# Patient Record
Sex: FEMALE | Race: WHITE | Employment: FULL TIME | URBAN - METROPOLITAN AREA
[De-identification: names, ages, dates, MRNs, and addresses within clinical notes are randomized per-mention and may not be internally consistent; named-entity substitution may affect disease eponyms.]

---

## 2017-03-08 ENCOUNTER — HOSPITAL ENCOUNTER (OUTPATIENT)
Dept: GENERAL RADIOLOGY | Age: 57
Discharge: HOME | End: 2017-03-08
Payer: COMMERCIAL

## 2017-03-08 ENCOUNTER — OFFICE VISIT (OUTPATIENT)
Dept: INTERNAL MEDICINE CLINIC | Age: 57
End: 2017-03-08

## 2017-03-08 VITALS
RESPIRATION RATE: 18 BRPM | TEMPERATURE: 98.5 F | BODY MASS INDEX: 38.44 KG/M2 | HEIGHT: 66 IN | SYSTOLIC BLOOD PRESSURE: 140 MMHG | HEART RATE: 79 BPM | OXYGEN SATURATION: 95 % | DIASTOLIC BLOOD PRESSURE: 90 MMHG | WEIGHT: 239.2 LBS

## 2017-03-08 DIAGNOSIS — Z01.818 PRE-OP EXAMINATION: ICD-10-CM

## 2017-03-08 DIAGNOSIS — M76.61 ACHILLES TENDINITIS OF RIGHT LOWER EXTREMITY: ICD-10-CM

## 2017-03-08 DIAGNOSIS — M77.31 HEEL SPUR, RIGHT: Primary | ICD-10-CM

## 2017-03-08 LAB
BILIRUB UR QL STRIP: NEGATIVE
GLUCOSE UR-MCNC: NEGATIVE MG/DL
KETONES P FAST UR STRIP-MCNC: NORMAL MG/DL
PH UR STRIP: 6 [PH] (ref 4.6–8)
PROT UR QL STRIP: NORMAL MG/DL
SP GR UR STRIP: 1.02 (ref 1–1.03)
UA UROBILINOGEN AMB POC: NORMAL (ref 0.2–1)
URINALYSIS CLARITY POC: CLEAR
URINALYSIS COLOR POC: YELLOW
URINE BLOOD POC: NEGATIVE
URINE LEUKOCYTES POC: NEGATIVE
URINE NITRITES POC: NEGATIVE

## 2017-03-08 PROCEDURE — 71020 XR CHEST PA LAT: CPT

## 2017-03-08 NOTE — MR AVS SNAPSHOT
Visit Information Date & Time Provider Department Dept. Phone Encounter #  
 3/8/2017  9:45 AM Jey Merino [id number] [phone] [id number] Follow-up Instructions Return in about 1 year (around 3/8/2018) for physical.  
  
Upcoming Health Maintenance Date Due INFLUENZA AGE 9 TO ADULT 8/1/2016 FOBT Q 1 YEAR AGE 50-75 12/8/2017 BREAST CANCER SCRN MAMMOGRAM 12/8/2018 PAP AKA CERVICAL CYTOLOGY 12/1/2021 DTaP/Tdap/Td series (2 - Td) 11/24/2025 Allergies as of 3/8/2017  Review Complete On: 3/8/2017 By: Donnell Aguayo NP No Known Allergies Current Immunizations  Never Reviewed Name Date Tdap 11/24/2015 Not reviewed this visit You Were Diagnosed With   
  
 Codes Comments Heel spur, right    -  Primary ICD-10-CM: M77.31 
ICD-9-CM: 726.73 Achilles tendinitis of right lower extremity     ICD-10-CM: M76.61 
ICD-9-CM: 726.71 Pre-op examination     ICD-10-CM: P27.469 ICD-9-CM: V72.84 Vitals BP Pulse Temp Resp Height(growth percentile) Weight(growth percentile) 140/90 79 98.5 °F (36.9 °C) (Oral) 18 5' 5.98\" (1.676 m) 239 lb 3.2 oz (108.5 kg) SpO2 BMI OB Status Smoking Status 95% 38.63 kg/m2 Postmenopausal Never Smoker Vitals History BMI and BSA Data Body Mass Index Body Surface Area  
 38.63 kg/m 2 2.25 m 2 Preferred Pharmacy Pharmacy Name Phone [phone] [facility] [phone] Your Updated Medication List  
  
   
This list is accurate as of: 3/8/17 10:42 AM.  Always use your most recent med list.  
  
  
  
  
 ergocalciferol 50,000 unit capsule Commonly known as:  ERGOCALCIFEROL Take 1 Cap by mouth every seven (7) days. Indications: VITAMIN D DEFICIENCY We Performed the Following AMB POC EKG ROUTINE W/ 12 LEADS, INTER & REP [01775 CPT(R)] AMB POC URINALYSIS DIP STICK AUTO W/O MICRO [81243 CPT(R)] CBC WITH AUTOMATED DIFF [65765 CPT(R)] METABOLIC PANEL, BASIC [40271 CPT(R)] Follow-up Instructions Return in about 1 year (around 3/8/2018) for physical.  
  
To-Do List   
 03/08/2017 Imaging:  XR CHEST PA LAT Introducing \[facility]\"" & HEALTH SERVICES! Dear Judah Saul: Thank you for requesting a Farseer account. Our records indicate that you have previously registered for a Farseer account but its currently inactive. Please call our Farseer support line at [phone]. Additional Information If you have questions, please visit the Frequently Asked Questions section of the Farseer website at [email or web address] Remember, Farseer is NOT to be used for urgent needs. For medical emergencies, dial 911. Now available from your iPhone and Android! Please provide this summary of care documentation to your next provider. Your primary care clinician is listed as Jose Ramon Echevarria. If you have any questions after today's visit, please call [phone].

## 2017-03-08 NOTE — PROGRESS NOTES
RM#7  Chief Complaint   Patient presents with    Pre-op Exam     heel spur surgery right foot     Results for orders placed or performed in visit on 03/08/17   AMB POC URINALYSIS DIP STICK AUTO W/O MICRO   Result Value Ref Range    Color (UA POC) Yellow     Clarity (UA POC) Clear     Glucose (UA POC) Negative Negative    Bilirubin (UA POC) Negative Negative    Ketones (UA POC) Trace Negative    Specific gravity (UA POC) 1.025 1.001 - 1.035    Blood (UA POC) Negative Negative    pH (UA POC) 6.0 4.6 - 8.0    Protein (UA POC) 1+ Negative mg/dL    Urobilinogen (UA POC) 0.2 mg/dL 0.2 - 1    Nitrites (UA POC) Negative Negative    Leukocyte esterase (UA POC) Negative Negative       1. Have you been to the ER, urgent care clinic since your last visit? Hospitalized since your last visit? No    2. Have you seen or consulted any other health care providers outside of the [address] [place] [address] since your last visit? Include any pap smears or colon screening.  No  No living will ADV

## 2017-03-09 LAB
BASOPHILS # BLD AUTO: 0 X10E3/UL (ref 0–0.2)
BASOPHILS NFR BLD AUTO: 0 %
BUN SERPL-MCNC: 16 MG/DL (ref 6–24)
BUN/CREAT SERPL: 26 (ref 9–23)
CALCIUM SERPL-MCNC: 9.4 MG/DL (ref 8.7–10.2)
CHLORIDE SERPL-SCNC: 101 MMOL/L (ref 96–106)
CO2 SERPL-SCNC: 23 MMOL/L (ref 18–29)
CREAT SERPL-MCNC: 0.62 MG/DL (ref 0.57–1)
EOSINOPHIL # BLD AUTO: 0.1 X10E3/UL (ref 0–0.4)
EOSINOPHIL NFR BLD AUTO: 1 %
ERYTHROCYTE [DISTWIDTH] IN BLOOD BY AUTOMATED COUNT: 14.1 % (ref 12.3–15.4)
GLUCOSE SERPL-MCNC: 88 MG/DL (ref 65–99)
HCT VFR BLD AUTO: 37.3 % (ref 34–46.6)
HGB BLD-MCNC: 12.4 G/DL (ref 11.1–15.9)
IMM GRANULOCYTES # BLD: 0 X10E3/UL (ref 0–0.1)
IMM GRANULOCYTES NFR BLD: 0 %
LYMPHOCYTES # BLD AUTO: 2.4 X10E3/UL (ref 0.7–3.1)
LYMPHOCYTES NFR BLD AUTO: 40 %
MCH RBC QN AUTO: 29.5 PG (ref 26.6–33)
MCHC RBC AUTO-ENTMCNC: 33.2 G/DL (ref 31.5–35.7)
MCV RBC AUTO: 89 FL (ref 79–97)
MONOCYTES # BLD AUTO: 0.4 X10E3/UL (ref 0.1–0.9)
MONOCYTES NFR BLD AUTO: 6 %
NEUTROPHILS # BLD AUTO: 3.2 X10E3/UL (ref 1.4–7)
NEUTROPHILS NFR BLD AUTO: 53 %
PLATELET # BLD AUTO: 306 X10E3/UL (ref 150–379)
POTASSIUM SERPL-SCNC: 4 MMOL/L (ref 3.5–5.2)
RBC # BLD AUTO: 4.2 X10E6/UL (ref 3.77–5.28)
SODIUM SERPL-SCNC: 143 MMOL/L (ref 134–144)
WBC # BLD AUTO: 6.1 X10E3/UL (ref 3.4–10.8)

## 2017-03-13 PROBLEM — M77.30 HEEL SPUR: Status: ACTIVE | Noted: 2017-03-13

## 2017-03-13 PROBLEM — M76.61 ACHILLES TENDINITIS OF RIGHT LOWER EXTREMITY: Status: ACTIVE | Noted: 2017-03-13

## 2017-03-13 PROBLEM — Z01.818 PRE-OP EXAMINATION: Status: ACTIVE | Noted: 2017-03-13

## 2017-03-13 NOTE — PROGRESS NOTES
HISTORY OF PRESENT ILLNESS  Sally Alberto is a 65 y.o. female presents for pre op clearance  HPI  She is scheduled for right achilles repair due to heel spur. Dr. Cerna Hopes She reports severe right heel  pain, worse with activity. Failed PT    History reviewed. No pertinent past medical history. Past Surgical History:   Procedure Laterality Date    HX GYN      3 cesearin births    HX HEENT Bilateral     laser surgery on both eyes    HX ORTHOPAEDIC Left     remove a bone spur       Current Outpatient Prescriptions on File Prior to Visit   Medication Sig Dispense Refill    ergocalciferol (ERGOCALCIFEROL) 50,000 unit capsule Take 1 Cap by mouth every seven (7) days. Indications: VITAMIN D DEFICIENCY 6 Cap 0     No current facility-administered medications on file prior to visit. No Known Allergies     Family History   Problem Relation Age of Onset    Cancer Mother     Breast Cancer Mother 80    Cancer Sister     Breast Cancer Sister 48    Heart Disease Father      heart attack in his 66's         Review of Systems   All other systems reviewed and are negative. Physical Exam   Constitutional: She is oriented to person, place, and time. She appears well-developed and well-nourished. No distress. Cardiovascular: Normal rate, regular rhythm and normal heart sounds. Pulmonary/Chest: Effort normal and breath sounds normal.   Musculoskeletal: She exhibits no edema or deformity. Right foot: There is tenderness, bony tenderness and swelling. There is normal capillary refill, no crepitus, no deformity and no laceration. Left foot: There is swelling. There is normal range of motion, no tenderness and no bony tenderness. Feet:    Neurological: She is alert and oriented to person, place, and time. Skin: She is not diaphoretic. ASSESSMENT and PLAN    ICD-10-CM ICD-9-CM    1. Heel spur, right M77.31 726.73    2.  Achilles tendinitis of right lower extremity M76.61 726.71 3. Pre-op examination Z01.818 V72.84 CBC WITH AUTOMATED DIFF      METABOLIC PANEL, BASIC      AMB POC EKG ROUTINE W/ 12 LEADS, INTER & REP      XR CHEST PA LAT      AMB POC URINALYSIS DIP STICK AUTO W/O MICRO     Follow-up Disposition:  Return in about 1 year (around 3/8/2018) for physical.     Normal EKG.     Surgery clearance pending lab review

## 2017-10-22 ENCOUNTER — HOSPITAL ENCOUNTER (EMERGENCY)
Age: 57
Discharge: HOME | End: 2017-10-22
Attending: EMERGENCY MEDICINE
Payer: COMMERCIAL

## 2017-10-22 ENCOUNTER — APPOINTMENT (OUTPATIENT)
Dept: CT IMAGING | Age: 57
End: 2017-10-22
Attending: EMERGENCY MEDICINE
Payer: COMMERCIAL

## 2017-10-22 VITALS
DIASTOLIC BLOOD PRESSURE: 70 MMHG | HEIGHT: 66 IN | OXYGEN SATURATION: 98 % | SYSTOLIC BLOOD PRESSURE: 141 MMHG | BODY MASS INDEX: 38.51 KG/M2 | TEMPERATURE: 97.6 F | WEIGHT: 239.6 LBS | HEART RATE: 62 BPM | RESPIRATION RATE: 16 BRPM

## 2017-10-22 DIAGNOSIS — R10.31 ABDOMINAL PAIN, RIGHT LOWER QUADRANT: Primary | ICD-10-CM

## 2017-10-22 LAB
ALBUMIN SERPL-MCNC: 3.6 G/DL (ref 3.5–5)
ALBUMIN/GLOB SERPL: 1.1 {RATIO} (ref 1.1–2.2)
ALP SERPL-CCNC: 73 U/L (ref 45–117)
ALT SERPL-CCNC: 27 U/L (ref 12–78)
ANION GAP SERPL CALC-SCNC: 6 MMOL/L (ref 5–15)
APPEARANCE UR: CLEAR
AST SERPL-CCNC: 16 U/L (ref 15–37)
BACTERIA URNS QL MICRO: NEGATIVE /HPF
BASOPHILS # BLD: 0 K/UL (ref 0–0.1)
BASOPHILS NFR BLD: 0 % (ref 0–1)
BILIRUB SERPL-MCNC: 0.3 MG/DL (ref 0.2–1)
BILIRUB UR QL: NEGATIVE
BUN SERPL-MCNC: 21 MG/DL (ref 6–20)
BUN/CREAT SERPL: 28 (ref 12–20)
CALCIUM SERPL-MCNC: 8.8 MG/DL (ref 8.5–10.1)
CHLORIDE SERPL-SCNC: 107 MMOL/L (ref 97–108)
CO2 SERPL-SCNC: 28 MMOL/L (ref 21–32)
COLOR UR: NORMAL
CREAT SERPL-MCNC: 0.75 MG/DL (ref 0.55–1.02)
EOSINOPHIL # BLD: 0.1 K/UL (ref 0–0.4)
EOSINOPHIL NFR BLD: 1 % (ref 0–7)
EPITH CASTS URNS QL MICRO: NORMAL /LPF
ERYTHROCYTE [DISTWIDTH] IN BLOOD BY AUTOMATED COUNT: 12.9 % (ref 11.5–14.5)
GLOBULIN SER CALC-MCNC: 3.3 G/DL (ref 2–4)
GLUCOSE SERPL-MCNC: 93 MG/DL (ref 65–100)
GLUCOSE UR STRIP.AUTO-MCNC: NEGATIVE MG/DL
HCT VFR BLD AUTO: 37.4 % (ref 35–47)
HGB BLD-MCNC: 12.1 G/DL (ref 11.5–16)
HGB UR QL STRIP: NEGATIVE
HYALINE CASTS URNS QL MICRO: NORMAL /LPF (ref 0–5)
KETONES UR QL STRIP.AUTO: NEGATIVE MG/DL
LEUKOCYTE ESTERASE UR QL STRIP.AUTO: NEGATIVE
LYMPHOCYTES # BLD: 2.4 K/UL (ref 0.8–3.5)
LYMPHOCYTES NFR BLD: 43 % (ref 12–49)
MCH RBC QN AUTO: 29.4 PG (ref 26–34)
MCHC RBC AUTO-ENTMCNC: 32.4 G/DL (ref 30–36.5)
MCV RBC AUTO: 90.8 FL (ref 80–99)
MONOCYTES # BLD: 0.4 K/UL (ref 0–1)
MONOCYTES NFR BLD: 6 % (ref 5–13)
NEUTS SEG # BLD: 2.8 K/UL (ref 1.8–8)
NEUTS SEG NFR BLD: 50 % (ref 32–75)
NITRITE UR QL STRIP.AUTO: NEGATIVE
PH UR STRIP: 6 [PH] (ref 5–8)
PLATELET # BLD AUTO: 220 K/UL (ref 150–400)
POTASSIUM SERPL-SCNC: 3.8 MMOL/L (ref 3.5–5.1)
PROT SERPL-MCNC: 6.9 G/DL (ref 6.4–8.2)
PROT UR STRIP-MCNC: NEGATIVE MG/DL
RBC # BLD AUTO: 4.12 M/UL (ref 3.8–5.2)
RBC #/AREA URNS HPF: NORMAL /HPF (ref 0–5)
SODIUM SERPL-SCNC: 141 MMOL/L (ref 136–145)
SP GR UR REFRACTOMETRY: 1.02 (ref 1–1.03)
UR CULT HOLD, URHOLD: NORMAL
UROBILINOGEN UR QL STRIP.AUTO: 0.2 EU/DL (ref 0.2–1)
WBC # BLD AUTO: 5.7 K/UL (ref 3.6–11)
WBC URNS QL MICRO: NORMAL /HPF (ref 0–4)

## 2017-10-22 PROCEDURE — 85025 COMPLETE CBC W/AUTO DIFF WBC: CPT | Performed by: EMERGENCY MEDICINE

## 2017-10-22 PROCEDURE — 96361 HYDRATE IV INFUSION ADD-ON: CPT

## 2017-10-22 PROCEDURE — 96360 HYDRATION IV INFUSION INIT: CPT

## 2017-10-22 PROCEDURE — 74177 CT ABD & PELVIS W/CONTRAST: CPT

## 2017-10-22 PROCEDURE — 74011000258 HC RX REV CODE- 258: Performed by: EMERGENCY MEDICINE

## 2017-10-22 PROCEDURE — 87491 CHLMYD TRACH DNA AMP PROBE: CPT | Performed by: EMERGENCY MEDICINE

## 2017-10-22 PROCEDURE — 99283 EMERGENCY DEPT VISIT LOW MDM: CPT

## 2017-10-22 PROCEDURE — 74011636320 HC RX REV CODE- 636/320: Performed by: EMERGENCY MEDICINE

## 2017-10-22 PROCEDURE — 36415 COLL VENOUS BLD VENIPUNCTURE: CPT | Performed by: EMERGENCY MEDICINE

## 2017-10-22 PROCEDURE — 81001 URINALYSIS AUTO W/SCOPE: CPT | Performed by: EMERGENCY MEDICINE

## 2017-10-22 PROCEDURE — 74011250636 HC RX REV CODE- 250/636: Performed by: EMERGENCY MEDICINE

## 2017-10-22 PROCEDURE — 80053 COMPREHEN METABOLIC PANEL: CPT | Performed by: EMERGENCY MEDICINE

## 2017-10-22 RX ORDER — HYDROCODONE BITARTRATE AND ACETAMINOPHEN 5; 325 MG/1; MG/1
1 TABLET ORAL
Qty: 5 TAB | Refills: 0 | Status: SHIPPED | OUTPATIENT
Start: 2017-10-22 | End: 2017-11-02

## 2017-10-22 RX ORDER — SODIUM CHLORIDE 9 MG/ML
1000 INJECTION, SOLUTION INTRAVENOUS ONCE
Status: COMPLETED | OUTPATIENT
Start: 2017-10-22 | End: 2017-10-22

## 2017-10-22 RX ORDER — SODIUM CHLORIDE 0.9 % (FLUSH) 0.9 %
10 SYRINGE (ML) INJECTION
Status: COMPLETED | OUTPATIENT
Start: 2017-10-22 | End: 2017-10-22

## 2017-10-22 RX ADMIN — SODIUM CHLORIDE 100 ML: 900 INJECTION, SOLUTION INTRAVENOUS at 20:59

## 2017-10-22 RX ADMIN — IOPAMIDOL 100 ML: 755 INJECTION, SOLUTION INTRAVENOUS at 20:58

## 2017-10-22 RX ADMIN — Medication 10 ML: at 20:59

## 2017-10-22 RX ADMIN — SODIUM CHLORIDE 1000 ML: 900 INJECTION, SOLUTION INTRAVENOUS at 19:59

## 2017-10-22 NOTE — ED TRIAGE NOTES
[facility] lower groin pain that is painful for her to ambulate x 2 days. Denies urinary symptoms.

## 2017-10-22 NOTE — ED PROVIDER NOTES
HPI Comments: 71-year-old female presents to the room for evaluation of right lower quadrant abdominal pain. Pain began 2 days ago. Pain has been intermittent. Pain is described as excruciating when it is present. Pain is intermittent in nature and lasts for approximately 3-4 hours. Pain resolvesby itself. Patient has no associated nausea, vomiting, diarrhea. No fever or chills. No noted blood in urine. No dysuria frequency or urgency. No known precipitating events. Patient has never had pain like this before. Currently the pain is minimal. Pain rated 0/10. Avani Correa Patient had 2 Aleve this morning with no relief. No aggravating factors. No change in location of pain. No decrease in appetite. Social hx  Nonsmoker  No alcohol    Patient is a 65 y.o. female presenting with groin pain. The history is provided by the patient. Groin Pain   Associated symptoms include abdominal pain. Pertinent negatives include no chest pain, no headaches and no shortness of breath. History reviewed. No pertinent past medical history. Past Surgical History:   Procedure Laterality Date    HX GYN      3 cesearin births    HX HEENT Bilateral     laser surgery on both eyes    HX ORTHOPAEDIC Left     remove a bone spur         Family History:   Problem Relation Age of Onset    Cancer Mother     Breast Cancer Mother 80    Cancer Sister     Breast Cancer Sister 48    Heart Disease Father      heart attack in his 66's       Social History     Social History    Marital status: SINGLE     Spouse name: N/A    Number of children: N/A    Years of education: N/A     Occupational History    Not on file. Social History Main Topics    Smoking status: Never Smoker    Smokeless tobacco: Never Used    Alcohol use No    Drug use: No    Sexual activity: Not on file     Other Topics Concern    Not on file     Social History Narrative         ALLERGIES: Review of patient's allergies indicates no known allergies.     Review of Systems Constitutional: Negative for chills and fever. Respiratory: Negative for cough and shortness of breath. Cardiovascular: Negative for chest pain and palpitations. Gastrointestinal: Positive for abdominal pain. Negative for blood in stool, diarrhea, nausea and vomiting. Genitourinary: Negative for dysuria, flank pain, frequency and urgency. Musculoskeletal: Negative for arthralgias, myalgias, neck pain and neck stiffness. Skin: Negative for rash and wound. Neurological: Negative for dizziness, numbness and headaches. All other systems reviewed and are negative. Vitals:    10/22/17 1829   BP: (!) 161/98   Pulse: 69   Resp: 16   Temp: 97.4 °F (36.3 °C)   SpO2: 99%   Weight: 108.7 kg (239 lb 9.6 oz)   Height: 5' 6\" (1.676 m)            Physical Exam   Constitutional: She is oriented to person, place, and time. She appears well-developed and well-nourished. No distress. HENT:   Head: Normocephalic and atraumatic. Right Ear: External ear normal.   Left Ear: External ear normal.   Eyes: Conjunctivae and EOM are normal. Pupils are equal, round, and reactive to light. Neck: Normal range of motion. Neck supple. Cardiovascular: Normal rate and regular rhythm. Pulmonary/Chest: Effort normal and breath sounds normal. No respiratory distress. She has no wheezes. Abdominal: Soft. Normal appearance and bowel sounds are normal. She exhibits no shifting dullness, no distension, no pulsatile liver, no abdominal bruit, no pulsatile midline mass and no mass. There is no hepatosplenomegaly, splenomegaly or hepatomegaly. There is no tenderness. There is no rigidity, no rebound, no guarding, no CVA tenderness, no tenderness at McBurney's point and negative Medina's sign. Abdomen exposed for exam.  Soft, nontender. No pain with palpation. No peritoneal signs   Musculoskeletal: Normal range of motion. She exhibits no edema or tenderness.    Neurological: She is alert and oriented to person, place, and time. She has normal reflexes. No cranial nerve deficit. Coordination normal.   Skin: Skin is warm and dry. No rash noted. No erythema. Psychiatric: She has a normal mood and affect. Her behavior is normal. Judgment and thought content normal.   Nursing note and vitals reviewed. MDM  Number of Diagnoses or Management Options  Abdominal pain, right lower quadrant:   Diagnosis management comments: 59-year-old female presented for right lower quadrant abdominal pain. Abdomen soft and nontender with no pain on exam. She is afebrile. She is nontoxic appearing. Plan: Labs, urinalysis, CT scan    10:05 PM  Pt reevaluated. Pt continues to decline pain medicine. Patient is well hydrated, well appearing, and in no respiratory distress. Physical exam is reassuring, and without signs of serious illness. Given the patient's history, clinical course, physical exam, and imaging findings, abdominal pain is unlikely secondary to a surgical etiology. Pt has been offered pelvic ultrasound and exam. Pt declines. Patient will be discharged home with pain control and follow-up with primary care physician in one to two days. Patient and caregivers were instructed on signs and symptoms of reasons to return including fever, worsening pain, vomiting, blood in the stool or any other concerns. Standard narcotic and sedating medication warnings given  Patient's results have been reviewed with them. Patient and/or family have verbally conveyed their understanding and agreement of the patient's signs, symptoms, diagnosis, treatment and prognosis and additionally agree to follow up as recommended or return to the Emergency Room should their condition change prior to follow-up.   Discharge instructions have also been provided to the patient with some educational information regarding their diagnosis as well a list of reasons why they would want to return to the ER prior to their follow-up appointment should their condition change. Amount and/or Complexity of Data Reviewed  Discuss the patient with other providers: yes (ER attending-Rayo)    Patient Progress  Patient progress: stable    ED Course       Procedures             Pt case including HPI, PE, and all available lab and radiology results has been discussed with attending physician. Opportunity to evaluate patient has been provided to ER attending. Discharge and prescription plan has been agreed upon.

## 2017-10-23 LAB
C TRACH DNA SPEC QL NAA+PROBE: NEGATIVE
N GONORRHOEA DNA SPEC QL NAA+PROBE: NEGATIVE
SAMPLE TYPE: NORMAL
SERVICE CMNT-IMP: NORMAL
SPECIMEN SOURCE: NORMAL

## 2017-10-23 NOTE — DISCHARGE INSTRUCTIONS

## 2017-11-02 ENCOUNTER — OFFICE VISIT (OUTPATIENT)
Dept: INTERNAL MEDICINE CLINIC | Age: 57
End: 2017-11-02

## 2017-11-02 VITALS
DIASTOLIC BLOOD PRESSURE: 80 MMHG | RESPIRATION RATE: 12 BRPM | HEIGHT: 66 IN | TEMPERATURE: 98 F | BODY MASS INDEX: 38.41 KG/M2 | OXYGEN SATURATION: 98 % | HEART RATE: 77 BPM | SYSTOLIC BLOOD PRESSURE: 141 MMHG | WEIGHT: 239 LBS

## 2017-11-02 DIAGNOSIS — Z23 ENCOUNTER FOR IMMUNIZATION: ICD-10-CM

## 2017-11-02 DIAGNOSIS — R06.83 SNORING: ICD-10-CM

## 2017-11-02 DIAGNOSIS — Z12.39 SCREENING FOR BREAST CANCER: ICD-10-CM

## 2017-11-02 DIAGNOSIS — Z13.220 SCREENING FOR LIPID DISORDERS: ICD-10-CM

## 2017-11-02 DIAGNOSIS — Z12.11 SCREEN FOR COLON CANCER: ICD-10-CM

## 2017-11-02 DIAGNOSIS — Z00.129 WELL ADOLESCENT VISIT: Primary | ICD-10-CM

## 2017-11-02 DIAGNOSIS — I10 BENIGN ESSENTIAL HTN: ICD-10-CM

## 2017-11-02 NOTE — MR AVS SNAPSHOT
Visit Information Date & Time Provider Department Dept. Phone Encounter #  
 11/2/2017  3:45 PM Ozzie Golden MD [address] [facility] [phone] [id number] Follow-up Instructions Return in about 3 months (around 2/2/2018) for follw-up bp and dietary change. Upcoming Health Maintenance Date Due INFLUENZA AGE 9 TO ADULT 8/1/2017 FOBT Q 1 YEAR AGE 50-75 12/8/2017 BREAST CANCER SCRN MAMMOGRAM 12/8/2018 PAP AKA CERVICAL CYTOLOGY 12/1/2021 DTaP/Tdap/Td series (2 - Td) 11/24/2025 Allergies as of 11/2/2017  Review Complete On: 11/2/2017 By: Parminder Morrissey LPN No Known Allergies Current Immunizations  Never Reviewed Name Date Influenza Vaccine (Quad) PF  Incomplete Tdap 11/24/2015 Not reviewed this visit You Were Diagnosed With   
  
 Codes Comments Well adolescent visit    -  Primary ICD-10-CM: Z00.129 ICD-9-CM: V20.2 Encounter for immunization     ICD-10-CM: D98 ICD-9-CM: V03.89 Snoring     ICD-10-CM: R06.83 
ICD-9-CM: 786.09 Screen for colon cancer     ICD-10-CM: Z12.11 ICD-9-CM: V76.51 Screening for breast cancer     ICD-10-CM: Z12.31 
ICD-9-CM: V76.10 Benign essential HTN     ICD-10-CM: I10 
ICD-9-CM: 401.1 Class 2 obesity due to excess calories with serious comorbidity in adult, unspecified BMI     ICD-10-CM: E66.09 
ICD-9-CM: 278.00 Screening for lipid disorders     ICD-10-CM: Z13.220 ICD-9-CM: V77.91 Vitals BP Pulse Temp Resp Height(growth percentile) Weight(growth percentile) 141/80 (BP 1 Location: Right arm, BP Patient Position: Sitting) 77 98 °F (36.7 °C) (Oral) 12 5' 6\" (1.676 m) 239 lb (108.4 kg) SpO2 BMI OB Status Smoking Status 98% 38.58 kg/m2 Postmenopausal Never Smoker BMI and BSA Data Body Mass Index Body Surface Area 38.58 kg/m 2 2.25 m 2 Preferred Pharmacy Pharmacy Name Phone [facility] [phone] Your Updated Medication List  
  
   
This list is accurate as of: 11/2/17  4:35 PM.  Always use your most recent med list.  
  
  
  
  
 ergocalciferol 50,000 unit capsule Commonly known as:  ERGOCALCIFEROL Take 1 Cap by mouth every seven (7) days. Indications: VITAMIN D DEFICIENCY We Performed the Following INFLUENZA VIRUS VAC QUAD,SPLIT,PRESV FREE SYRINGE IM [id number] CPT(R)] OCCULT BLOOD, IMMUNOASSAY (FIT) [id number] CPT(R)] Follow-up Instructions Return in about 3 months (around 2/2/2018) for follw-up bp and dietary change. To-Do List   
 11/02/2017 Lab:  LIPID PANEL   
  
 11/06/2017 Imaging:  KATHERINE MAMMO BI SCREENING INCL CAD Patient Instructions Sleep Apnea: Care Instructions Your Care Instructions Sleep apnea means that you frequently stop breathing for 10 seconds or longer during sleep. It can be mild to severe, based on the number of times an hour that you stop breathing or have slowed breathing. Blocked or narrowed airways in your nose, mouth, or throat can cause sleep apnea. Your airway can become blocked when your throat muscles and tongue relax during sleep. You can treat sleep apnea at home by making lifestyle changes. You also can use a CPAP breathing machine that keeps tissues in the throat from blocking your airway. Or your doctor may suggest that you use a breathing device while you sleep. It helps keep your airway open. This could be a device that you put in your mouth. Other examples include strips or disks that you use on your nose. In some cases, surgery may be needed to remove enlarged tissues in the throat. Follow-up care is a key part of your treatment and safety.  Be sure to make and go to all appointments, and call your doctor if you are having problems. It's also a good idea to know your test results and keep a list of the medicines you take. How can you care for yourself at home? · Lose weight, if needed. It may reduce the number of times you stop breathing or have slowed breathing. · Sleep on your side. It may stop mild apnea. If you tend to roll onto your back, sew a pocket in the back of your pajama top. Put a tennis ball into the pocket, and stitch the pocket shut. This will help keep you from sleeping on your back. · Avoid alcohol and medicines such as sleeping pills and sedatives before bed. · Do not smoke. Smoking can make sleep apnea worse. If you need help quitting, talk to your doctor about stop-smoking programs and medicines. These can increase your chances of quitting for good. · Prop up the head of your bed 4 to 6 inches by putting bricks under the legs of the bed. · Treat breathing problems, such as a stuffy nose, caused by a cold or allergies. · Try a continuous positive airway pressure (CPAP) breathing machine if your doctor recommends it. The machine keeps your airway open when you sleep. · If CPAP does not work for you, ask your doctor if you can try other breathing machines. A bilevel positive airway pressure machine uses one type of air pressure for breathing in and another type for breathing out. Another device raises or lowers air pressure as needed while you breathe. · Talk to your doctor if: 
¨ Your nose feels dry or bleeds when you use one of these machines. You may need to increase moisture in the air. A humidifier may help. ¨ Your nose is runny or stuffy from using a breathing machine. Decongestants or a corticosteroid nasal spray may help. ¨ You are sleepy during the day and it gets in the way of the normal things you do. Do not drive when you are drowsy. When should you call for help? Watch closely for changes in your health, and be sure to contact your doctor if: ? · You still have sleep apnea even though you have made lifestyle changes. ? · You are thinking of trying a device such as CPAP. ? · You are having problems using a CPAP or similar machine. Where can you learn more? Go to http://clyde-silva.info/. Enter U447 in the search box to learn more about \"Sleep Apnea: Care Instructions. \" Current as of: May 12, 2017 Content Version: 11.4 © 3657-4790 [employer]. Care instructions adapted under license by [employer] (which disclaims liability or warranty for this information). If you have questions about a medical condition or this instruction, always ask your healthcare professional. [address] any warranty or liability for your use of this information. Learning About Low-Carbohydrate Diets for Weight Loss What is a low-carbohydrate diet? Low-carb diets avoid foods that are high in carbohydrate. These high-carb foods include pasta, bread, rice, cereal, fruits, and starchy vegetables. Instead, these diets usually have you eat foods that are high in fat and protein. Many people lose weight quickly on a low-carb diet. But the early weight loss is water. People on this diet often gain the weight back after they start eating carbs again. Not all diet plans are safe or work well. A lot of the evidence shows that low-carb diets aren't healthy. That's because these diets often don't include healthy foods like fruits and vegetables. Losing weight safely means balancing protein, fat, and carbs with every meal and snack. And low-carb diets don't always provide the vitamins, minerals, and fiber you need. If you have a serious medical condition, talk to your doctor before you try any diet. These conditions include kidney disease, heart disease, type 2 diabetes, high cholesterol, and high blood pressure. If you are pregnant, it may not be safe for your baby if you are on a low-carb diet. How can you lose weight safely? You might have heard that a diet plan helped another person lose weight. But that doesn't mean that it will work for you. It is very hard to stay on a diet that includes lots of big changes in your eating habits. If you want to get to a healthy weight and stay there, making healthy lifestyle changes will often work better than dieting. These steps can help. · Make a plan for change. Work with your doctor to create a plan that is right for you. · See a dietitian. He or she can show you how to make healthy changes in your eating habits. · Manage stress. If you have a lot of stress in your life, it can be hard to focus on making healthy changes to your daily habits. · Track your food and activity. You are likely to do better at losing weight if you keep track of what you eat and what you do. Follow-up care is a key part of your treatment and safety. Be sure to make and go to all appointments, and call your doctor if you are having problems. It's also a good idea to know your test results and keep a list of the medicines you take. Where can you learn more? Go to http://clyde-silva.info/. Enter A121 in the search box to learn more about \"Learning About Low-Carbohydrate Diets for Weight Loss. \" Current as of: May 12, 2017 Content Version: 11.4 © 3230-8950 [employer]. Care instructions adapted under license by [employer] (which disclaims liability or warranty for this information). If you have questions about a medical condition or this instruction, always ask your healthcare professional. [place] [address] any warranty or liability for your use of this information. Introducing [facility]! Dear Jennifer Courser: Thank you for requesting a Anametrix account. Our records indicate that you have previously registered for a Anametrix account but its currently inactive. Please call our Anametrix support line at [phone]. Additional Information If you have questions, please visit the Frequently Asked Questions section of the FlyData[employer] website at [email or web address] Remember, [facility] [employer] is NOT to be used for urgent needs. For medical emergencies, dial 911. Now available from your iPhone and Android! Please provide this summary of care documentation to your next provider. Your primary care clinician is listed as July Ceballos. If you have any questions after today's visit, please call [phone].

## 2017-11-02 NOTE — PROGRESS NOTES
Rm#4  Chief Complaint   Patient presents with    Hypertension     1. Have you been to the ER, urgent care clinic since your last visit? Hospitalized since your last visit? Yes pain in right side, [facility], 10-20-17    2. Have you seen or consulted any other health care providers outside of the [address] [place] [address] since your last visit? Include any pap smears or colon screening. Yes dR. [address]  -ultrasound 11-1-17-normal   Health Maintenance Due   Topic Date Due    INFLUENZA AGE 9 TO ADULT  08/01/2017    FOBT Q 1 YEAR AGE 50-75  12/08/2017     Pt wants flu vacc  Hm reviewed

## 2017-11-02 NOTE — PROGRESS NOTES
ARABELLA Alberto is a 65 y.o. female, she presents today for:    Recent episode of right lower quadrat pain. Seen in ER and gynecologist. CT and ultrasound are normal. Pain has receded somewhat. Blood pressure borderline elevated. Works nights, [employer]. Also works for [employer]. PMH/PSH: reviewed and updated  Sochx:  reports that she has never smoked. She has never used smokeless tobacco. She reports that she does not drink alcohol or use illicit drugs. Famhx: reviewed and updated     All: No Known Allergies  Med:   Current Outpatient Prescriptions   Medication Sig    HYDROcodone-acetaminophen (NORCO) 5-325 mg per tablet Take 1 Tab by mouth every six (6) hours as needed for Pain. Max Daily Amount: 4 Tabs.  ergocalciferol (ERGOCALCIFEROL) 50,000 unit capsule Take 1 Cap by mouth every seven (7) days. Indications: VITAMIN D DEFICIENCY     No current facility-administered medications for this visit. Review of Systems   Constitutional: Negative for chills, fever and malaise/fatigue. Respiratory: Negative for shortness of breath. Cardiovascular: Negative for chest pain. PE:  Blood pressure 141/80, pulse 77, temperature 98 °F (36.7 °C), temperature source Oral, resp. rate 12, height 5' 6\" (1.676 m), weight 239 lb (108.4 kg), SpO2 98 %. Body mass index is 38.58 kg/(m^2). Physical Exam   Constitutional: She is oriented to person, place, and time. She appears well-developed and well-nourished. No distress. HENT:   Head: Normocephalic. Mouth/Throat: Oropharynx is clear and moist.   Eyes: Conjunctivae and EOM are normal.   Neck: Neck supple. Cardiovascular: Normal rate, regular rhythm and normal heart sounds. Pulmonary/Chest: Effort normal and breath sounds normal.   Neurological: She is alert and oriented to person, place, and time. Skin: Skin is warm and dry. Nursing note and vitals reviewed. Labs:   No results found for any visits on 11/02/17.     A/P:  65 y.o. female ICD-10-CM ICD-9-CM    1. Well adolescent visit Z00.129 V20.2    2. Encounter for immunization Z23 V03.89 INFLUENZA VIRUS VAC QUAD,SPLIT,PRESV FREE SYRINGE IM   3. Snoring R06.83 786.09    4. Screen for colon cancer Z12.11 V76.51 OCCULT BLOOD, IMMUNOASSAY (FIT)   5. Screening for breast cancer Z12.31 V76.10 KATHERINE MAMMO BI SCREENING INCL CAD   6. Benign essential HTN I10 401.1    7. Class 2 obesity due to excess calories with serious comorbidity in adult, unspecified BMI E66.09 278.00    8. Screening for lipid disorders Z13.220 V77.91 LIPID PANEL     Well woman (non-gyn) exam: history and exam revealed issues as noted below. Cancer screening: pap up to date. Recently seen by gyn as well and will be following up if any further vaginal bleeding. Endometrial stripe between 4 and 5mm per patient. -  Fit test ordered. - mammogram ordered  Vaccine status: up to date, flu vaccine today  Cardiovascular risk: BP well controlled, lipid screen requested  Bone health: daily vit D supplement encouraged. Diet and Exercise: discussed weight loss through low carb diet. HTN: early stage 1. Patient would like to commit to lifestyle change, Hold on starting medication for next 3 months.    - discussed sleep apnea screening and provided information.    - She was given AVS and expressed understanding with the diagnosis and plan as discussed. Follow-up Disposition: Not on File  No future appointments.

## 2017-11-02 NOTE — PATIENT INSTRUCTIONS
Sleep Apnea: Care Instructions  Your Care Instructions    Sleep apnea means that you frequently stop breathing for 10 seconds or longer during sleep. It can be mild to severe, based on the number of times an hour that you stop breathing or have slowed breathing. Blocked or narrowed airways in your nose, mouth, or throat can cause sleep apnea. Your airway can become blocked when your throat muscles and tongue relax during sleep. You can treat sleep apnea at home by making lifestyle changes. You also can use a CPAP breathing machine that keeps tissues in the throat from blocking your airway. Or your doctor may suggest that you use a breathing device while you sleep. It helps keep your airway open. This could be a device that you put in your mouth. Other examples include strips or disks that you use on your nose. In some cases, surgery may be needed to remove enlarged tissues in the throat. Follow-up care is a key part of your treatment and safety. Be sure to make and go to all appointments, and call your doctor if you are having problems. It's also a good idea to know your test results and keep a list of the medicines you take. How can you care for yourself at home? · Lose weight, if needed. It may reduce the number of times you stop breathing or have slowed breathing. · Sleep on your side. It may stop mild apnea. If you tend to roll onto your back, sew a pocket in the back of your pajama top. Put a tennis ball into the pocket, and stitch the pocket shut. This will help keep you from sleeping on your back. · Avoid alcohol and medicines such as sleeping pills and sedatives before bed. · Do not smoke. Smoking can make sleep apnea worse. If you need help quitting, talk to your doctor about stop-smoking programs and medicines. These can increase your chances of quitting for good. · Prop up the head of your bed 4 to 6 inches by putting bricks under the legs of the bed.   · Treat breathing problems, such as a stuffy nose, caused by a cold or allergies. · Try a continuous positive airway pressure (CPAP) breathing machine if your doctor recommends it. The machine keeps your airway open when you sleep. · If CPAP does not work for you, ask your doctor if you can try other breathing machines. A bilevel positive airway pressure machine uses one type of air pressure for breathing in and another type for breathing out. Another device raises or lowers air pressure as needed while you breathe. · Talk to your doctor if:  ¨ Your nose feels dry or bleeds when you use one of these machines. You may need to increase moisture in the air. A humidifier may help. ¨ Your nose is runny or stuffy from using a breathing machine. Decongestants or a corticosteroid nasal spray may help. ¨ You are sleepy during the day and it gets in the way of the normal things you do. Do not drive when you are drowsy. When should you call for help? Watch closely for changes in your health, and be sure to contact your doctor if:  ? · You still have sleep apnea even though you have made lifestyle changes. ? · You are thinking of trying a device such as CPAP. ? · You are having problems using a CPAP or similar machine. Where can you learn more? Go to http://clyde-silva.info/. Enter F952 in the search box to learn more about \"Sleep Apnea: Care Instructions. \"  Current as of: May 12, 2017  Content Version: 11.4  © 0141-8371 [employer]. Care instructions adapted under license by [employer] (which disclaims liability or warranty for this information). If you have questions about a medical condition or this instruction, always ask your healthcare professional. [address] any warranty or liability for your use of this information. Learning About Low-Carbohydrate Diets for Weight Loss  What is a low-carbohydrate diet? Low-carb diets avoid foods that are high in carbohydrate.  These high-carb foods include pasta, bread, rice, cereal, fruits, and starchy vegetables. Instead, these diets usually have you eat foods that are high in fat and protein. Many people lose weight quickly on a low-carb diet. But the early weight loss is water. People on this diet often gain the weight back after they start eating carbs again. Not all diet plans are safe or work well. A lot of the evidence shows that low-carb diets aren't healthy. That's because these diets often don't include healthy foods like fruits and vegetables. Losing weight safely means balancing protein, fat, and carbs with every meal and snack. And low-carb diets don't always provide the vitamins, minerals, and fiber you need. If you have a serious medical condition, talk to your doctor before you try any diet. These conditions include kidney disease, heart disease, type 2 diabetes, high cholesterol, and high blood pressure. If you are pregnant, it may not be safe for your baby if you are on a low-carb diet. How can you lose weight safely? You might have heard that a diet plan helped another person lose weight. But that doesn't mean that it will work for you. It is very hard to stay on a diet that includes lots of big changes in your eating habits. If you want to get to a healthy weight and stay there, making healthy lifestyle changes will often work better than dieting. These steps can help. · Make a plan for change. Work with your doctor to create a plan that is right for you. · See a dietitian. He or she can show you how to make healthy changes in your eating habits. · Manage stress. If you have a lot of stress in your life, it can be hard to focus on making healthy changes to your daily habits. · Track your food and activity. You are likely to do better at losing weight if you keep track of what you eat and what you do. Follow-up care is a key part of your treatment and safety.  Be sure to make and go to all appointments, and call your doctor if you are having problems. It's also a good idea to know your test results and keep a list of the medicines you take. Where can you learn more? Go to http://clyde-silva.info/. Enter A121 in the search box to learn more about \"Learning About Low-Carbohydrate Diets for Weight Loss. \"  Current as of: May 12, 2017  Content Version: 11.4  © 8151-2340 [employer]. Care instructions adapted under license by [employer] (which disclaims liability or warranty for this information). If you have questions about a medical condition or this instruction, always ask your healthcare professional. [address] any warranty or liability for your use of this information.

## 2018-08-20 ENCOUNTER — OFFICE VISIT (OUTPATIENT)
Dept: INTERNAL MEDICINE CLINIC | Age: 58
End: 2018-08-20

## 2018-08-20 VITALS
TEMPERATURE: 98.5 F | OXYGEN SATURATION: 96 % | SYSTOLIC BLOOD PRESSURE: 155 MMHG | HEART RATE: 70 BPM | WEIGHT: 226.6 LBS | HEIGHT: 66 IN | BODY MASS INDEX: 36.42 KG/M2 | DIASTOLIC BLOOD PRESSURE: 96 MMHG | RESPIRATION RATE: 13 BRPM

## 2018-08-20 DIAGNOSIS — I10 ESSENTIAL HYPERTENSION: Primary | ICD-10-CM

## 2018-08-20 DIAGNOSIS — Z12.11 SCREEN FOR COLON CANCER: ICD-10-CM

## 2018-08-20 DIAGNOSIS — Z12.39 BREAST CANCER SCREENING: ICD-10-CM

## 2018-08-20 DIAGNOSIS — R53.83 FATIGUE, UNSPECIFIED TYPE: ICD-10-CM

## 2018-08-20 DIAGNOSIS — Z13.220 SCREENING FOR LIPID DISORDERS: ICD-10-CM

## 2018-08-20 DIAGNOSIS — E55.9 VITAMIN D DEFICIENCY: ICD-10-CM

## 2018-08-20 RX ORDER — HYDROCHLOROTHIAZIDE 12.5 MG/1
12.5 TABLET ORAL DAILY
Qty: 30 TAB | Refills: 2 | Status: SHIPPED | OUTPATIENT
Start: 2018-08-20 | End: 2018-10-02 | Stop reason: ALTCHOICE

## 2018-08-20 NOTE — MR AVS SNAPSHOT
[address]  [facility] [address] [address] 
[phone] Patient: Mirta Fung MRN: [id number] :1960 Visit Information Date & Time Provider Department Dept. Phone Encounter #  
 2018  8:15 AM Kell Oropeza MD [address] [phone] [id number] Follow-up Instructions Return in about 6 weeks (around 10/1/2018) for 1-2 monthsfor BP check, follow-up HTN/fatigue. Upcoming Health Maintenance Date Due FOBT Q 1 YEAR AGE 50-75 2017 Influenza Age 5 to Adult 2018 BREAST CANCER SCRN MAMMOGRAM 2018 PAP AKA CERVICAL CYTOLOGY 2021 DTaP/Tdap/Td series (2 - Td) 2025 Allergies as of 2018  Review Complete On: 2018 By: Kell Oropeza MD  
 No Known Allergies Current Immunizations  Reviewed on 2017 Name Date Influenza Vaccine (Quad) PF 2017  4:46 PM  
 Tdap 2015 Not reviewed this visit You Were Diagnosed With   
  
 Codes Comments Essential hypertension    -  Primary ICD-10-CM: I10 
ICD-9-CM: 401.9 Vitamin D deficiency     ICD-10-CM: E55.9 ICD-9-CM: 268.9 Fatigue, unspecified type     ICD-10-CM: R53.83 ICD-9-CM: 780.79 Breast cancer screening     ICD-10-CM: Z12.31 
ICD-9-CM: V76.10 Screening for lipid disorders     ICD-10-CM: Z13.220 ICD-9-CM: V77.91 Screen for colon cancer     ICD-10-CM: Z12.11 ICD-9-CM: V76.51 Vitals BP Pulse Temp OB Status Smoking Status (!) 155/96 (BP 1 Location: Left arm) 70 98.5 °F (36.9 °C) (Oral) Postmenopausal Never Smoker Preferred Pharmacy Pharmacy Name Phone [address] [phone] Your Updated Medication List  
  
   
This list is accurate as of 18  9:01 AM.  Always use your most recent med list.  
  
  
  
  
 ergocalciferol 50,000 unit capsule Commonly known as:  ERGOCALCIFEROL Take 1 Cap by mouth every seven (7) days. Indications: VITAMIN D DEFICIENCY  
  
 hydroCHLOROthiazide 12.5 mg tablet Commonly known as:  HYDRODIURIL Take 1 Tab by mouth daily. Prescriptions Sent to Pharmacy Refills  
 hydroCHLOROthiazide (HYDRODIURIL) 12.5 mg tablet 2 Sig: Take 1 Tab by mouth daily. Class: Normal  
 Pharmacy: [address] [facility] [place], [address] [place] [address] Ph #: [phone] Route: Oral  
  
We Performed the Following LIPID PANEL [92941 CPT(R)] METABOLIC PANEL, BASIC [93220 CPT(R)] OCCULT BLOOD IMMUNOASSAY,DIAGNOSTIC [03844 CPT(R)] TSH RFX ON ABNORMAL TO FREE T4 [[id number] Custom] Follow-up Instructions Return in about 6 weeks (around 10/1/2018) for 1-2 monthsfor BP check, follow-up HTN/fatigue. To-Do List   
 Around 08/27/2018 Imaging:  KATHERINE MAMMO BI SCREENING INCL CAD Patient Instructions Learning About Diuretics for High Blood Pressure Introduction Diuretics help to lower blood pressure. This reduces your risk of a heart attack and stroke. It also reduces your risk of kidney disease. Diuretics cause your kidneys to remove sodium and water. They also relax the blood vessel walls. These help lower your blood pressure. Examples · Chlorthalidone · Hydrochlorothiazide Possible side effects There are some common side effects. They are: · Too little potassium. · Feeling dizzy. · Rash. · Urinating a lot. · High blood sugar. (But this is not common.) You may have other side effects. Check the information that comes with your medicine. What to know about taking this medicine · You may take other medicines for blood pressure. Diuretics can help those work better. They can also prevent extra fluid in your body. · You may need to take potassium pills. Or you may have to watch how much potassium is in your food. Ask your doctor about this. · You may need blood tests to check your kidneys and your potassium level. · Take your medicines exactly as prescribed. Call your doctor if you think you are having a problem with your medicine. · Check with your doctor or pharmacist before you use any other medicines. This includes over-the-counter medicines. Make sure your doctor knows all of the medicines, vitamins, herbal products, and supplements you take. Taking some medicines together can cause problems. Where can you learn more? Go to http://clyde-silva.info/. Enter N594 in the search box to learn more about \"Learning About Diuretics for High Blood Pressure. \" Current as of: May 10, 2017 Content Version: 11.7 © 8049-2813 [employer]. Care instructions adapted under license by [employer] (which disclaims liability or warranty for this information). If you have questions about a medical condition or this instruction, always ask your healthcare professional. [address] any warranty or liability for your use of this information. Introducing [facility] HE[facility]! Dear Sandra Romano: Thank you for requesting a [employer] account. Our records indicate that you have previously registered for a [employer] account but its currently inactive. Please call our [employer] support line at [phone]. Additional Information If you have questions, please visit the Frequently Asked Questions section of the [employer] website at [email or web address] Remember, MyChart is NOT to be used for urgent needs. For medical emergencies, dial 911. Now available from your iPhone and Android! Please provide this summary of care documentation to your next provider. Your primary care clinician is listed as Carmine Taylor. If you have any questions after today's visit, please call [phone].

## 2018-08-20 NOTE — PROGRESS NOTES
ARABELLA Hoover is a 63 y.o. female, she presents today for:    63year old woman presents for elevated blood pressure. Previously discussed hypertension in November at last visit, declined starting medication as stage 1 and wanted to try for lifestyle modification. Did not complete a 3 month follow-up as advised. Normal range kdney function and glucose at that visit. Okay A1C (5.4) and LDL (115) in 2015    Today reports:    - she missed her last appointment and had to reschedule. - here today for general follow-up. Not sleeping well and feeling tired. PMH/PSH: reviewed and updated  Sochx:  reports that she has never smoked. She has never used smokeless tobacco. She reports that she does not drink alcohol or use illicit drugs. Famhx: reviewed and updated     All: No Known Allergies  Med:   Current Outpatient Prescriptions   Medication Sig    ergocalciferol (ERGOCALCIFEROL) 50,000 unit capsule Take 1 Cap by mouth every seven (7) days. Indications: VITAMIN D DEFICIENCY     No current facility-administered medications for this visit. Review of Systems   Constitutional: Negative for chills, fever and malaise/fatigue. Respiratory: Negative for shortness of breath. Cardiovascular: Negative for chest pain, palpitations and leg swelling. Neurological: Negative for headaches. PE:  Blood pressure (!) 155/96, pulse 70, temperature 98.5 °F (36.9 °C), temperature source Oral.  There is no height or weight on file to calculate BMI. Physical Exam   Constitutional: She is oriented to person, place, and time. She appears well-developed and well-nourished. No distress. Appears tired, low energy   HENT:   Head: Normocephalic. Mouth/Throat: Oropharynx is clear and moist.   Eyes: Conjunctivae and EOM are normal.   Neck: Neck supple. No thyromegaly present. Cardiovascular: Normal rate, regular rhythm and normal heart sounds.     Pulmonary/Chest: Effort normal and breath sounds normal. Neurological: She is alert and oriented to person, place, and time. Skin: Skin is warm and dry. Nursing note and vitals reviewed. Labs:   No results found for any visits on 08/20/18. A/P:  63 y.o. female    ICD-10-CM ICD-9-CM    1. Essential hypertension I10 401.9 hydroCHLOROthiazide (HYDRODIURIL) 12.5 mg tablet      METABOLIC PANEL, BASIC   2. Vitamin D deficiency E55.9 268.9    3. Fatigue, unspecified type R53.83 780.79 TSH RFX ON ABNORMAL TO FREE T4   4. Breast cancer screening Z12.31 V76.10 KATHERINE MAMMO BI SCREENING INCL CAD   5. Screening for lipid disorders Z13.220 V77.91 LIPID PANEL   6. Screen for colon cancer Z12.11 V76.51 OCCULT BLOOD IMMUNOASSAY,DIAGNOSTIC   HTN: BP continues to be elevated, after discussion will start on low dose diuretic.    - baseline bmp repeated today. - baseline ekg wnl from 2017   - screening exams for breast cancer, colon cancer, hld requested. Fatigue: unclear that sleep time and quality sufficient discussed this some today. Given weight gain will also check thyroid. Last labs without any sign of anemia. - increase sleep hours. - try to record self while sleeping or ask someone to listen in to see if snoring/gasping.    - She was given AVS and expressed understanding with the diagnosis and plan as discussed. Follow-up Disposition:  Return in about 6 weeks (around 10/1/2018) for 1-2 monthsfor BP check, follow-up HTN/fatigue. No future appointments.

## 2018-08-20 NOTE — PROGRESS NOTES
Exam room 172 [facility] Gage is a 63 y.o. female  There were no vitals taken for this visit. 1. Have you been to the ER, urgent care clinic since your last visit? Hospitalized since your last visit? No    2. Have you seen or consulted any other health care providers outside of the [facility] since your last visit? Include any pap smears or colon screening.  No    Health Maintenance Due   Topic Date Due    FOBT Q 1 YEAR AGE 50-75  12/08/2017    Influenza Age 5 to Adult  08/01/2018    BREAST CANCER SCRN MAMMOGRAM  12/08/2018

## 2018-08-20 NOTE — PATIENT INSTRUCTIONS
Learning About Diuretics for High Blood Pressure  Introduction  Diuretics help to lower blood pressure. This reduces your risk of a heart attack and stroke. It also reduces your risk of kidney disease. Diuretics cause your kidneys to remove sodium and water. They also relax the blood vessel walls. These help lower your blood pressure. Examples  · Chlorthalidone  · Hydrochlorothiazide  Possible side effects  There are some common side effects. They are:  · Too little potassium. · Feeling dizzy. · Rash. · Urinating a lot. · High blood sugar. (But this is not common.)  You may have other side effects. Check the information that comes with your medicine. What to know about taking this medicine  · You may take other medicines for blood pressure. Diuretics can help those work better. They can also prevent extra fluid in your body. · You may need to take potassium pills. Or you may have to watch how much potassium is in your food. Ask your doctor about this. · You may need blood tests to check your kidneys and your potassium level. · Take your medicines exactly as prescribed. Call your doctor if you think you are having a problem with your medicine. · Check with your doctor or pharmacist before you use any other medicines. This includes over-the-counter medicines. Make sure your doctor knows all of the medicines, vitamins, herbal products, and supplements you take. Taking some medicines together can cause problems. Where can you learn more? Go to http://clyde-silva.info/. Enter X778 in the search box to learn more about \"Learning About Diuretics for High Blood Pressure. \"  Current as of: May 10, 2017  Content Version: 11.7  © 1677-9869 [employer]. Care instructions adapted under license by [employer] (which disclaims liability or warranty for this information).  If you have questions about a medical condition or this instruction, always ask your healthcare professional. [address] any warranty or liability for your use of this information.

## 2018-08-21 LAB
BUN SERPL-MCNC: 16 MG/DL (ref 6–24)
BUN/CREAT SERPL: 23 (ref 9–23)
CALCIUM SERPL-MCNC: 9 MG/DL (ref 8.7–10.2)
CHLORIDE SERPL-SCNC: 104 MMOL/L (ref 96–106)
CHOLEST SERPL-MCNC: 224 MG/DL (ref 100–199)
CO2 SERPL-SCNC: 25 MMOL/L (ref 20–29)
CREAT SERPL-MCNC: 0.69 MG/DL (ref 0.57–1)
GLUCOSE SERPL-MCNC: 84 MG/DL (ref 65–99)
HDLC SERPL-MCNC: 55 MG/DL
LDLC SERPL CALC-MCNC: 144 MG/DL (ref 0–99)
POTASSIUM SERPL-SCNC: 4.3 MMOL/L (ref 3.5–5.2)
SODIUM SERPL-SCNC: 143 MMOL/L (ref 134–144)
TRIGL SERPL-MCNC: 127 MG/DL (ref 0–149)
TSH SERPL DL<=0.005 MIU/L-ACNC: 3.43 UIU/ML (ref 0.45–4.5)
VLDLC SERPL CALC-MCNC: 25 MG/DL (ref 5–40)

## 2018-09-06 LAB — HEMOCCULT STL QL IA: NEGATIVE

## 2018-09-06 NOTE — PROGRESS NOTES
Fecal immunochemical test negative for abnormality. This is not a high risk result. Plan to repeat stool test in 1 year to continue screening. Congratulations on a normal test result. Lab letter sent.

## 2018-10-02 ENCOUNTER — OFFICE VISIT (OUTPATIENT)
Dept: INTERNAL MEDICINE CLINIC | Age: 58
End: 2018-10-02

## 2018-10-02 VITALS
DIASTOLIC BLOOD PRESSURE: 94 MMHG | TEMPERATURE: 98.2 F | OXYGEN SATURATION: 95 % | BODY MASS INDEX: 36.8 KG/M2 | SYSTOLIC BLOOD PRESSURE: 162 MMHG | RESPIRATION RATE: 13 BRPM | HEART RATE: 70 BPM | WEIGHT: 229 LBS | HEIGHT: 66 IN

## 2018-10-02 DIAGNOSIS — I10 ESSENTIAL HYPERTENSION: Primary | ICD-10-CM

## 2018-10-02 DIAGNOSIS — E78.2 MIXED HYPERLIPIDEMIA: ICD-10-CM

## 2018-10-02 DIAGNOSIS — E66.01 SEVERE OBESITY (HCC): ICD-10-CM

## 2018-10-02 DIAGNOSIS — Z23 ENCOUNTER FOR IMMUNIZATION: ICD-10-CM

## 2018-10-02 RX ORDER — LISINOPRIL AND HYDROCHLOROTHIAZIDE 12.5; 2 MG/1; MG/1
1 TABLET ORAL DAILY
Qty: 30 TAB | Refills: 2 | Status: SHIPPED | OUTPATIENT
Start: 2018-10-02 | End: 2019-02-04 | Stop reason: SDUPTHER

## 2018-10-02 NOTE — PROGRESS NOTES
Exam room 1 Karl Rinaldi is a 63 y.o. female Visit Vitals  BP (!) 162/94 (BP 1 Location: Left arm, BP Patient Position: Sitting)  Pulse 70  Temp 98.2 °F (36.8 °C) (Oral)  Resp 13  Ht 5' 6\" (1.676 m)  Wt 229 lb (103.9 kg)  SpO2 95%  BMI 36.96 kg/m2 Chief Complaint Patient presents with  Fatigue  Blood Pressure Check  Hypertension  
pt is here for elevated BP, flu vaccine, pt states that she does have a problem remembering to take her medications  On a regular basis. Pt states her Bp is usually a little more elevated than usual when she is at any doctors office. States another concern she has is taking medication on an empty stomach. 1. Have you been to the ER, urgent care clinic since your last visit? Hospitalized since your last visit? No 
 
2. Have you seen or consulted any other health care providers outside of the [address] [place] [address] since your last visit? Include any pap smears or colon screening. No 
 
Health Maintenance Due Topic Date Due  Shingrix Vaccine Age 50> (1 of 2) 02/26/2011  Influenza Age 5 to Adult  08/01/2018  BREAST CANCER SCRN MAMMOGRAM  12/08/2018 Learning Assessment 11/24/2015 PRIMARY LEARNER Patient HIGHEST LEVEL OF EDUCATION - PRIMARY LEARNER  2 YEARS OF COLLEGE  
BARRIERS PRIMARY LEARNER NONE PRIMARY LANGUAGE ENGLISH  
LEARNER PREFERENCE PRIMARY DEMONSTRATION  
ANSWERED BY patient RELATIONSHIP SELF

## 2018-10-02 NOTE — PROGRESS NOTES
ARABELLA Atwood is a 63 y.o. female, she presents today for: 
 
63year old woman returns for evaluation of elevated blood pressure. Started on hydrochlorthiazide last visit. Blood pressure higher on recheck today. In the past 7 days has taken 5 days. Notes that she has pretty much felt the same. Works at [employer] and plans to start measuring at [facility]. No ihstory of smoking. No family history of heart disease. PMH/PSH: reviewed and updated Sochx:  reports that she has never smoked. She has never used smokeless tobacco. She reports that she does not drink alcohol or use illicit drugs. Famhx: reviewed and updated All: No Known Allergies Med:  
Current Outpatient Prescriptions Medication Sig  
 hydroCHLOROthiazide (HYDRODIURIL) 12.5 mg tablet Take 1 Tab by mouth daily.  ergocalciferol (ERGOCALCIFEROL) 50,000 unit capsule Take 1 Cap by mouth every seven (7) days. Indications: VITAMIN D DEFICIENCY No current facility-administered medications for this visit. Review of Systems Constitutional: Negative for chills, fever and malaise/fatigue. HENT: Negative for congestion. Respiratory: Negative for shortness of breath. Cardiovascular: Negative for chest pain. Gastrointestinal: Negative for abdominal pain, heartburn, nausea and vomiting. PE: 
Blood pressure (!) 162/94, pulse 70, temperature 98.2 °F (36.8 °C), temperature source Oral, resp. rate 13, height 5' 6\" (1.676 m), weight 229 lb (103.9 kg), SpO2 95 %. Body mass index is 36.96 kg/(m^2). Physical Exam  
Constitutional: She is oriented to person, place, and time. She appears well-developed and well-nourished. No distress. HENT:  
Head: Normocephalic. Mouth/Throat: Oropharynx is clear and moist.  
Eyes: Conjunctivae and EOM are normal.  
Neck: Neck supple. No thyromegaly present. Cardiovascular: Normal rate, regular rhythm and normal heart sounds. No murmur heard. Pulmonary/Chest: Effort normal and breath sounds normal.  
Musculoskeletal: She exhibits no edema. Lymphadenopathy:  
  She has no cervical adenopathy. Neurological: She is alert and oriented to person, place, and time. Skin: Skin is warm and dry. Nursing note and vitals reviewed. Labs:  
No results found for any visits on 10/02/18. A/P: 
63 y.o. female ICD-10-CM ICD-9-CM 1. Essential hypertension I10 401.9 lisinopril-hydroCHLOROthiazide (PRINZIDE, ZESTORETIC) 20-12.5 mg per tablet 2. Encounter for immunization Z23 V03.89 INFLUENZA VIRUS VAC QUAD,SPLIT,PRESV FREE SYRINGE IM 3. Severe obesity ([facility] [address] E66.01 278.01   
4. Mixed hyperlipidemia E78.2 272.2 HTN: BP worse today compared to prior. May be time of day, stress. But patient not taking medication reguarly. With 2 reading in stage 2, will go ahead and 
 -  add in ace with thiazide. - measure at home 
 - follow-up in 6 weeks with BP check and BMP. HLD: with ASCVD 10 year schore around 5.5%. Reviewed with patient. Continue to encouarge healthy activity and exercise. Obesity: as noted above. Walking dog every day. Discussed low carbohydrate diet and need to make shift in her daily habits to protect her longterm health. Flu vaccine. provided - She was given AVS and expressed understanding with the diagnosis and plan as discussed. Follow-up Disposition: 
Return in about 6 weeks (around 11/13/2018) for follow-up blood pressure after medication change. Future Appointments Date Time Provider Michelle Mcdonald 11/14/2018 8:15 AM Ant Peraza MD [address] [facility]

## 2018-10-02 NOTE — MR AVS SNAPSHOT
[address] [place] [address] E [address] Kauffman [address] 
[phone] Patient: Lola Fairchild MRN: [id number] :1960 Visit Information Date & Time Provider Department Dept. Phone Encounter #  
 10/2/2018  8:00 AM Armand Aguilar, [address] [place] [address] and Internal Medicine [phone] [id number] Follow-up Instructions Return in about 6 weeks (around 2018) for follow-up blood pressure after medication change. Upcoming Health Maintenance Date Due Shingrix Vaccine Age 50> (1 of 2) 2011 Influenza Age 5 to Adult 2018 BREAST CANCER SCRN MAMMOGRAM 2018 FOBT Q 1 YEAR AGE 50-75 2019 PAP AKA CERVICAL CYTOLOGY 2021 DTaP/Tdap/Td series (2 - Td) 2025 Allergies as of 10/2/2018  Review Complete On: 2018 By: Armand Aguilar MD  
 No Known Allergies Current Immunizations  Reviewed on 2017 Name Date Influenza Vaccine (Quad) PF  Incomplete, 2017  4:46 PM  
 Tdap 2015 Not reviewed this visit You Were Diagnosed With   
  
 Codes Comments Essential hypertension    -  Primary ICD-10-CM: I10 
ICD-9-CM: 401.9 Encounter for immunization     ICD-10-CM: H59 ICD-9-CM: V03.89 Vitals BP Pulse Temp Resp Height(growth percentile) Weight(growth percentile) (!) 162/94 (BP 1 Location: Left arm, BP Patient Position: Sitting) 70 98.2 °F (36.8 °C) (Oral) 13 5' 6\" (1.676 m) 229 lb (103.9 kg) SpO2 BMI OB Status Smoking Status 95% 36.96 kg/m2 Postmenopausal Never Smoker BMI and BSA Data Body Mass Index Body Surface Area  
 36.96 kg/m 2 2.2 m 2 Preferred Pharmacy Pharmacy Name Phone [address] [facility] [phone] Your Updated Medication List  
  
   
This list is accurate as of 10/2/18  8:48 AM.  Always use your most recent med list.  
  
  
  
  
 ergocalciferol 50,000 unit capsule Commonly known as:  ERGOCALCIFEROL Take 1 Cap by mouth every seven (7) days. Indications: VITAMIN D DEFICIENCY  
  
 lisinopril-hydroCHLOROthiazide 20-12.5 mg per tablet Commonly known as:  Celestine Montes Take 1 Tab by mouth daily. Prescriptions Sent to Pharmacy Refills  
 lisinopril-hydroCHLOROthiazide (PRINZIDE, ZESTORETIC) 20-12.5 mg per tablet 2 Sig: Take 1 Tab by mouth daily. Class: Normal  
 Pharmacy: [address] [place] [address], [address] [place] [address] #: [phone] Route: Oral  
  
We Performed the Following INFLUENZA VIRUS VAC QUAD,SPLIT,PRESV FREE SYRINGE IM [id number] CPT(R)] Follow-up Instructions Return in about 6 weeks (around 11/13/2018) for follow-up blood pressure after medication change. Patient Instructions Change medication from single therapy hydrochlorothiazide to prinzide-hydrochlorothiazide. Learning About High Blood Pressure What is high blood pressure? Blood pressure is a measure of how hard the blood pushes against the walls of your arteries. It's normal for blood pressure to go up and down throughout the day, but if it stays up, you have high blood pressure. Another name for high blood pressure is hypertension. Two numbers tell you your blood pressure. The first number is the systolic pressure. It shows how hard the blood pushes when your heart is pumping. The second number is the diastolic pressure. It shows how hard the blood pushes between heartbeats, when your heart is relaxed and filling with blood. A blood pressure of less than 120/80 (say \"120 over 80\") is ideal for an adult. High blood pressure is 130/80 or higher. You have high blood pressure if your top number is 130 or higher or your bottom number is 80 or higher, or both. What happens when you have high blood pressure? · Blood flows through your arteries with too much force.  Over time, this damages the walls of your arteries. But you can't feel it. High blood pressure usually doesn't cause symptoms. · Fat and calcium start to build up in your arteries. This buildup is called plaque. Plaque makes your arteries narrower and stiffer. Blood can't flow through them as easily. · This lack of good blood flow starts to damage some of the organs in your body. This can lead to problems such as coronary artery disease and heart attack, heart failure, stroke, kidney failure, and eye damage. How can you prevent high blood pressure? · Stay at a healthy weight. · Try to limit how much sodium you eat to less than 2,300 milligrams (mg) a day. If you limit your sodium to 1,500 mg a day, you can lower your blood pressure even more. ¨ Buy foods that are labeled \"unsalted,\" \"sodium-free,\" or \"low-sodium. \" Foods labeled \"reduced-sodium\" and \"light sodium\" may still have too much sodium. ¨ Flavor your food with garlic, lemon juice, onion, vinegar, herbs, and spices instead of salt. Do not use soy sauce, steak sauce, onion salt, garlic salt, mustard, or ketchup on your food. ¨ Use less salt (or none) when recipes call for it. You can often use half the salt a recipe calls for without losing flavor. · Be physically active. Get at least 30 minutes of exercise on most days of the week. Walking is a good choice. You also may want to do other activities, such as running, swimming, cycling, or playing tennis or team sports. · Limit alcohol to 2 drinks a day for men and 1 drink a day for women. · Eat plenty of fruits, vegetables, and low-fat dairy products. Eat less saturated and total fats. How is high blood pressure treated? · Your doctor will suggest making lifestyle changes. For example, your doctor may ask you to eat healthy foods, quit smoking, lose extra weight, and be more active. · If lifestyle changes don't help enough or your blood pressure is very high, you will have to take medicine every day. Follow-up care is a key part of your treatment and safety. Be sure to make and go to all appointments, and call your doctor if you are having problems. It's also a good idea to know your test results and keep a list of the medicines you take. Where can you learn more? Go to http://clyde-silva.info/. Enter P501 in the search box to learn more about \"Learning About High Blood Pressure. \" Current as of: May 10, 2017 Content Version: 11.7 © 2130-9709 [employer]. Care instructions adapted under license by [employer]phant[employer] (which disclaims liability or warranty for this information). If you have questions about a medical condition or this instruction, always ask your healthcare professional. [address] any warranty or liability for your use of this information. Introducing [facility]! Dear William Granados: Thank you for requesting a Vamo account. Our records indicate that you have previously registered for a Vamo account but its currently inactive. Please call our Vamo support line at [phone]. Additional Information If you have questions, please visit the Frequently Asked Questions section of the Vamo website at [email or web address] Remember, Vamo is NOT to be used for urgent needs. For medical emergencies, dial 911. Now available from your iPhone and Android! Please provide this summary of care documentation to your next provider. Your primary care clinician is listed as Emil Garcia. If you have any questions after today's visit, please call [phone].

## 2018-10-02 NOTE — PATIENT INSTRUCTIONS
Change medication from single therapy hydrochlorothiazide to prinzide-hydrochlorothiazide. Learning About High Blood Pressure What is high blood pressure? Blood pressure is a measure of how hard the blood pushes against the walls of your arteries. It's normal for blood pressure to go up and down throughout the day, but if it stays up, you have high blood pressure. Another name for high blood pressure is hypertension. Two numbers tell you your blood pressure. The first number is the systolic pressure. It shows how hard the blood pushes when your heart is pumping. The second number is the diastolic pressure. It shows how hard the blood pushes between heartbeats, when your heart is relaxed and filling with blood. A blood pressure of less than 120/80 (say \"120 over 80\") is ideal for an adult. High blood pressure is 130/80 or higher. You have high blood pressure if your top number is 130 or higher or your bottom number is 80 or higher, or both. What happens when you have high blood pressure? · Blood flows through your arteries with too much force. Over time, this damages the walls of your arteries. But you can't feel it. High blood pressure usually doesn't cause symptoms. · Fat and calcium start to build up in your arteries. This buildup is called plaque. Plaque makes your arteries narrower and stiffer. Blood can't flow through them as easily. · This lack of good blood flow starts to damage some of the organs in your body. This can lead to problems such as coronary artery disease and heart attack, heart failure, stroke, kidney failure, and eye damage. How can you prevent high blood pressure? · Stay at a healthy weight. · Try to limit how much sodium you eat to less than 2,300 milligrams (mg) a day. If you limit your sodium to 1,500 mg a day, you can lower your blood pressure even more. ¨ Buy foods that are labeled \"unsalted,\" \"sodium-free,\" or \"low-sodium. \" Foods labeled \"reduced-sodium\" and \"light sodium\" may still have too much sodium. ¨ Flavor your food with garlic, lemon juice, onion, vinegar, herbs, and spices instead of salt. Do not use soy sauce, steak sauce, onion salt, garlic salt, mustard, or ketchup on your food. ¨ Use less salt (or none) when recipes call for it. You can often use half the salt a recipe calls for without losing flavor. · Be physically active. Get at least 30 minutes of exercise on most days of the week. Walking is a good choice. You also may want to do other activities, such as running, swimming, cycling, or playing tennis or team sports. · Limit alcohol to 2 drinks a day for men and 1 drink a day for women. · Eat plenty of fruits, vegetables, and low-fat dairy products. Eat less saturated and total fats. How is high blood pressure treated? · Your doctor will suggest making lifestyle changes. For example, your doctor may ask you to eat healthy foods, quit smoking, lose extra weight, and be more active. · If lifestyle changes don't help enough or your blood pressure is very high, you will have to take medicine every day. Follow-up care is a key part of your treatment and safety. Be sure to make and go to all appointments, and call your doctor if you are having problems. It's also a good idea to know your test results and keep a list of the medicines you take. Where can you learn more? Go to http://clyde-silva.info/. Enter P501 in the search box to learn more about \"Learning About High Blood Pressure. \" Current as of: May 10, 2017 Content Version: 11.7 © 6505-6155 [employer], [employer]. Care instructions adapted under license by [employer] (which disclaims liability or warranty for this information).  If you have questions about a medical condition or this instruction, always ask your healthcare professional. [address] any warranty or liability for your use of this information.

## 2019-02-04 ENCOUNTER — OFFICE VISIT (OUTPATIENT)
Dept: INTERNAL MEDICINE CLINIC | Age: 59
End: 2019-02-04

## 2019-02-04 VITALS
HEIGHT: 66 IN | OXYGEN SATURATION: 96 % | RESPIRATION RATE: 17 BRPM | HEART RATE: 79 BPM | BODY MASS INDEX: 37.73 KG/M2 | SYSTOLIC BLOOD PRESSURE: 136 MMHG | TEMPERATURE: 98.5 F | DIASTOLIC BLOOD PRESSURE: 76 MMHG | WEIGHT: 234.8 LBS

## 2019-02-04 DIAGNOSIS — R79.89 LOW SERUM VITAMIN D: Primary | ICD-10-CM

## 2019-02-04 DIAGNOSIS — I10 ESSENTIAL HYPERTENSION: ICD-10-CM

## 2019-02-04 RX ORDER — LISINOPRIL AND HYDROCHLOROTHIAZIDE 12.5; 2 MG/1; MG/1
0.5 TABLET ORAL DAILY
Qty: 30 TAB | Refills: 2 | Status: SHIPPED | OUTPATIENT
Start: 2019-02-04 | End: 2019-09-12 | Stop reason: SDUPTHER

## 2019-02-04 NOTE — PROGRESS NOTES
Exam room 2 Rudolph Johnson is a 63 y.o. female Chief Complaint Patient presents with  Hypertension  
  follow up  Medication Evaluation 1. Have you been to the ER, urgent care clinic since your last visit? Hospitalized since your last visit? No 
 
2. Have you seen or consulted any other health care providers outside of the [address] [place] [address] since your last visit? Include any pap smears or colon screening. No  
 
Health Maintenance Due Topic Date Due  Shingrix Vaccine Age 50> (1 of 2) 02/26/2011  BREAST CANCER SCRN MAMMOGRAM  12/08/2018

## 2019-02-04 NOTE — PROGRESS NOTES
ARABELLA Montoya is a 63 y.o. female, she presents today for: HTN: out of medication for last 2 weeks. taking prinzide 20-12.5 Has changed job and is planning to follow better hours, less stress overall with new job. Is cutting out sodas and trying to stay with water to help with obesity. Walt Kimblerema PMH/PSH: reviewed and updated Sochx:  reports that  has never smoked. she has never used smokeless tobacco. She reports that she does not drink alcohol or use drugs. Famhx: reviewed and updated All: No Known Allergies Med:  
Current Outpatient Medications Medication Sig  
 lisinopril-hydroCHLOROthiazide (PRINZIDE, ZESTORETIC) 20-12.5 mg per tablet Take 1 Tab by mouth daily.  ergocalciferol (ERGOCALCIFEROL) 50,000 unit capsule Take 1 Cap by mouth every seven (7) days. Indications: VITAMIN D DEFICIENCY No current facility-administered medications for this visit. Review of Systems Constitutional: Negative for chills, fever and malaise/fatigue. Respiratory: Negative for shortness of breath. Cardiovascular: Negative for chest pain. PE: 
Blood pressure 136/76, pulse 79, temperature 98.5 °F (36.9 °C), temperature source Oral, resp. rate 17, height 5' 6\" (1.676 m), weight 234 lb 12.8 oz (106.5 kg), SpO2 96 %. Body mass index is 37.9 kg/m². Physical Exam  
Constitutional: She is oriented to person, place, and time. She appears well-developed and well-nourished. No distress. HENT:  
Head: Normocephalic. Mouth/Throat: Oropharynx is clear and moist.  
Eyes: Conjunctivae and EOM are normal.  
Neck: Neck supple. No thyromegaly present. Cardiovascular: Normal rate, regular rhythm and normal heart sounds. No murmur heard. Pulmonary/Chest: Effort normal and breath sounds normal.  
Musculoskeletal: She exhibits no edema. Lymphadenopathy:  
  She has no cervical adenopathy. Neurological: She is alert and oriented to person, place, and time. Skin: Skin is warm and dry. Nursing note and vitals reviewed. Labs:  
See addendum A/P: 
63 y.o. female ICD-10-CM ICD-9-CM 1. Low serum vitamin D R79.89 790.6 VITAMIN D, 25 HYDROXY 2. Essential hypertension I10 401.9 lisinopril-hydroCHLOROthiazide (PRINZIDE, ZESTORETIC) 20-12.5 mg per tablet METABOLIC PANEL, BASIC Follow-up BMp requested after resuming combination BP medication. Low vitamin D: follow-up level requested. - She was given AVS and expressed understanding with the diagnosis and plan as discussed. Follow-up Disposition: 
Return in about 6 weeks (around 3/18/2019) for well woman visit and lab visit for non-fasting labs in 2-4 weeks. Stephen Sol

## 2019-02-04 NOTE — PATIENT INSTRUCTIONS

## 2019-09-12 DIAGNOSIS — I10 ESSENTIAL HYPERTENSION: ICD-10-CM

## 2019-09-12 RX ORDER — LISINOPRIL AND HYDROCHLOROTHIAZIDE 12.5; 2 MG/1; MG/1
TABLET ORAL
Qty: 45 TAB | Refills: 0 | Status: SHIPPED | OUTPATIENT
Start: 2019-09-12 | End: 2019-12-04 | Stop reason: SDUPTHER

## 2019-11-26 ENCOUNTER — OFFICE VISIT (OUTPATIENT)
Dept: INTERNAL MEDICINE CLINIC | Age: 59
End: 2019-11-26

## 2019-11-26 VITALS
RESPIRATION RATE: 17 BRPM | TEMPERATURE: 98.1 F | OXYGEN SATURATION: 97 % | HEIGHT: 66 IN | HEART RATE: 70 BPM | DIASTOLIC BLOOD PRESSURE: 84 MMHG | SYSTOLIC BLOOD PRESSURE: 122 MMHG | WEIGHT: 231 LBS | BODY MASS INDEX: 37.12 KG/M2

## 2019-11-26 DIAGNOSIS — E55.9 VITAMIN D DEFICIENCY: ICD-10-CM

## 2019-11-26 DIAGNOSIS — I10 ESSENTIAL HYPERTENSION: ICD-10-CM

## 2019-11-26 DIAGNOSIS — Z00.00 WELL ADULT EXAM: Primary | ICD-10-CM

## 2019-11-26 DIAGNOSIS — Z12.11 SCREEN FOR COLON CANCER: ICD-10-CM

## 2019-11-26 DIAGNOSIS — E66.01 SEVERE OBESITY (HCC): ICD-10-CM

## 2019-11-26 DIAGNOSIS — Z12.39 SCREENING FOR BREAST CANCER: ICD-10-CM

## 2019-11-26 DIAGNOSIS — Z23 ENCOUNTER FOR IMMUNIZATION: ICD-10-CM

## 2019-11-26 DIAGNOSIS — E78.2 MIXED HYPERLIPIDEMIA: ICD-10-CM

## 2019-11-26 PROBLEM — M77.30 HEEL SPUR: Status: RESOLVED | Noted: 2017-03-13 | Resolved: 2019-11-26

## 2019-11-26 PROBLEM — M76.61 ACHILLES TENDINITIS OF RIGHT LOWER EXTREMITY: Status: RESOLVED | Noted: 2017-03-13 | Resolved: 2019-11-26

## 2019-11-26 NOTE — PROGRESS NOTES
HPI   Aury Blank is a 63 y.o. female, she presents today for:    Reports that she has stopped drinking soda. Is trying to make changes. Bit by bit. Enjoying spending time with grandson. PMH/PSH: reviewed and updated  Sochx:  reports that she has never smoked. She has never used smokeless tobacco. She reports that she does not drink alcohol or use drugs. Famhx: reviewed and updated     All: No Known Allergies  Med:   Current Outpatient Medications   Medication Sig    lisinopril-hydroCHLOROthiazide (PRINZIDE, ZESTORETIC) 20-12.5 mg per tablet TAKE ONE-HALF TABLET BY MOUTH DAILY     No current facility-administered medications for this visit. ROS:   10 point review of systems negative except as noted in HPI or below:    PE: Blood pressure 122/84, pulse 70, temperature 98.1 °F (36.7 °C), temperature source Oral, resp. rate 17, height 5' 6\" (1.676 m), weight 231 lb (104.8 kg), SpO2 97 %. Body mass index is 37.28 kg/m². Physical Exam  Vitals signs and nursing note reviewed. Constitutional:       General: She is not in acute distress. HENT:      Head: Normocephalic. Right Ear: Tympanic membrane normal.      Left Ear: Tympanic membrane normal.   Eyes:      Conjunctiva/sclera: Conjunctivae normal.   Neck:      Musculoskeletal: Neck supple. Cardiovascular:      Rate and Rhythm: Normal rate and regular rhythm. Heart sounds: Normal heart sounds. Pulmonary:      Effort: Pulmonary effort is normal.      Breath sounds: Normal breath sounds. Abdominal:      General: Abdomen is flat. Skin:     General: Skin is warm and dry. Neurological:      Mental Status: She is alert and oriented to person, place, and time. Labs:   No results found for any visits on 11/26/19. A/P:  63 y.o. female    ICD-10-CM ICD-9-CM    1. Well adult exam Z00.00 V70.0    2. Essential hypertension Y31 762.2 METABOLIC PANEL, COMPREHENSIVE   3. Severe obesity (HCC) Y36.81 588.67 METABOLIC PANEL, COMPREHENSIVE   4. Encounter for immunization Z23 V03.89 INFLUENZA VIRUS VAC QUAD,SPLIT,PRESV FREE SYRINGE IM      ZOSTER VACC RECOMBINANT ADJUVANTED   5. Mixed hyperlipidemia E78.2 272.2 LIPID PANEL   6. Vitamin D deficiency E55.9 268.9    7. Screen for colon cancer Z12.11 V76.51 REFERRAL TO GASTROENTEROLOGY   8. Screening for breast cancer Z12.39 V76.10 KATHERINE 3D ABIDA W MAMMO BI SCREENING INCL CAD      Well woman (non-gyn) exam: history and exam revealed issues as noted below. Cancer screening:    Breast: mammogram.    cervical: plan for pap in 2 years   colon: FIT test ordered. breast: mammogram ordered. Vaccine status: influenza and shingles vaccine provided. Cardiovascular risk: BP well controlled, lipid screen requested. Bone health: daily vit D supplement. Diet and Exercise: not drinking sugary beverages. HTN: BP well controlled. continue 1/2 tablet prinzide. - She was given AVS and expressed understanding with the diagnosis and plan as discussed. Follow-up and Dispositions    · Return in about 6 months (around 5/26/2020) for blood pressure check.

## 2019-11-26 NOTE — PATIENT INSTRUCTIONS
Healthy Lifestyle Goals for a Healthy Body 8 - at least 8 hours of sleep 5 - at least 5 servings of fruits and vegetables per day 2 - no more than 2 hours of screen time per day. 1 - at least 1 hour of activity per day 0 - zero \"sweet beverages\" including: juice, soda, sports drinks, flavored milk. .. Well Visit, Women 48 to 72: Care Instructions Your Care Instructions Physical exams can help you stay healthy. Your doctor has checked your overall health and may have suggested ways to take good care of yourself. He or she also may have recommended tests. At home, you can help prevent illness with healthy eating, regular exercise, and other steps. Follow-up care is a key part of your treatment and safety. Be sure to make and go to all appointments, and call your doctor if you are having problems. It's also a good idea to know your test results and keep a list of the medicines you take. How can you care for yourself at home? · Reach and stay at a healthy weight. This will lower your risk for many problems, such as obesity, diabetes, heart disease, and high blood pressure. · Get at least 30 minutes of exercise on most days of the week. Walking is a good choice. You also may want to do other activities, such as running, swimming, cycling, or playing tennis or team sports. · Do not smoke. Smoking can make health problems worse. If you need help quitting, talk to your doctor about stop-smoking programs and medicines. These can increase your chances of quitting for good. · Protect your skin from too much sun. When you're outdoors from 10 a.m. to 4 p.m., stay in the shade or cover up with clothing and a hat with a wide brim. Wear sunglasses that block UV rays. Even when it's cloudy, put broad-spectrum sunscreen (SPF 30 or higher) on any exposed skin. · See a dentist one or two times a year for checkups and to have your teeth cleaned. · Wear a seat belt in the car. Follow your doctor's advice about when to have certain tests. These tests can spot problems early. · Cholesterol. Your doctor will tell you how often to have this done based on your age, family history, or other things that can increase your risk for heart attack and stroke. · Blood pressure. Have your blood pressure checked during a routine doctor visit. Your doctor will tell you how often to check your blood pressure based on your age, your blood pressure results, and other factors. · Mammogram. Ask your doctor how often you should have a mammogram, which is an X-ray of your breasts. A mammogram can spot breast cancer before it can be felt and when it is easiest to treat. · Pap test and pelvic exam. Ask your doctor how often you should have a Pap test. You may not need to have a Pap test as often as you used to. · Vision. Have your eyes checked every year or two or as often as your doctor suggests. Some experts recommend that you have yearly exams for glaucoma and other age-related eye problems starting at age 48. · Hearing. Tell your doctor if you notice any change in your hearing. You can have tests to find out how well you hear. · Diabetes. Ask your doctor whether you should have tests for diabetes. · Colorectal cancer. Your risk for colorectal cancer gets higher as you get older. Some experts say that adults should start regular screening at age 48 and stop at age 76. Others say to start before age 48 or continue after age 76. Talk with your doctor about your risk and when to start and stop screening. · Thyroid disease. Talk to your doctor about whether to have your thyroid checked as part of a regular physical exam. Women have an increased chance of a thyroid problem. · Osteoporosis. You should begin tests for bone density at age 72. If you are younger than 72, ask your doctor whether you have factors that may increase your risk for this disease. You may want to have this test before age 72. · Heart attack and stroke risk. At least every 4 to 6 years, you should have your risk for heart attack and stroke assessed. Your doctor uses factors such as your age, blood pressure, cholesterol, and whether you smoke or have diabetes to show what your risk for a heart attack or stroke is over the next 10 years. When should you call for help? Watch closely for changes in your health, and be sure to contact your doctor if you have any problems or symptoms that concern you. Where can you learn more? Go to http://clyde-silva.info/. Enter V567 in the search box to learn more about \"Well Visit, Women 50 to 72: Care Instructions. \" Current as of: December 13, 2018 Content Version: 12.2 © 3492-3195 [employer]. Care instructions adapted under license by [employer] (which disclaims liability or warranty for this information). If you have questions about a medical condition or this instruction, always ask your healthcare professional. [place] [address] any warranty or liability for your use of this information.

## 2019-11-26 NOTE — PROGRESS NOTES
RM 3    Patient fasting for lab work today - has not taken Bp medication yet this morning     Chief Complaint   Patient presents with    Complete Physical     1. Have you been to the ER, urgent care clinic since your last visit? Hospitalized since your last visit? No    2. Have you seen or consulted any other health care providers outside of the [address] [place] [address] since your last visit? Include any pap smears or colon screening.  No    Health Maintenance Due   Topic Date Due    Shingrix Vaccine Age 49> (1 of 2) 02/26/2011    BREAST CANCER SCRN MAMMOGRAM  12/08/2018    Influenza Age 9 to Adult  08/01/2019    FOBT Q 1 YEAR AGE 50-75  08/28/2019     Patient desires flu vaccine today     Learning Assessment 11/24/2015   PRIMARY LEARNER Patient   HIGHEST LEVEL OF EDUCATION - PRIMARY LEARNER  2 YEARS OF COLLEGE   BARRIERS PRIMARY LEARNER NONE   PRIMARY LANGUAGE ENGLISH   LEARNER PREFERENCE PRIMARY DEMONSTRATION   ANSWERED BY patient   RELATIONSHIP SELF

## 2019-11-27 ENCOUNTER — PATIENT MESSAGE (OUTPATIENT)
Dept: INTERNAL MEDICINE CLINIC | Age: 59
End: 2019-11-27

## 2019-11-27 DIAGNOSIS — E78.2 MIXED HYPERLIPIDEMIA: Primary | ICD-10-CM

## 2019-11-27 LAB
ALBUMIN SERPL-MCNC: 4.5 G/DL (ref 3.5–5.5)
ALBUMIN/GLOB SERPL: 1.8 {RATIO} (ref 1.2–2.2)
ALP SERPL-CCNC: 67 IU/L (ref 39–117)
ALT SERPL-CCNC: 21 IU/L (ref 0–32)
AST SERPL-CCNC: 19 IU/L (ref 0–40)
BILIRUB SERPL-MCNC: 0.4 MG/DL (ref 0–1.2)
BUN SERPL-MCNC: 16 MG/DL (ref 6–24)
BUN/CREAT SERPL: 20 (ref 9–23)
CALCIUM SERPL-MCNC: 9.5 MG/DL (ref 8.7–10.2)
CHLORIDE SERPL-SCNC: 106 MMOL/L (ref 96–106)
CHOLEST SERPL-MCNC: 295 MG/DL (ref 100–199)
CO2 SERPL-SCNC: 25 MMOL/L (ref 20–29)
CREAT SERPL-MCNC: 0.82 MG/DL (ref 0.57–1)
GLOBULIN SER CALC-MCNC: 2.5 G/DL (ref 1.5–4.5)
GLUCOSE SERPL-MCNC: 83 MG/DL (ref 65–99)
HDLC SERPL-MCNC: 54 MG/DL
LDLC SERPL CALC-MCNC: 211 MG/DL (ref 0–99)
POTASSIUM SERPL-SCNC: 4.4 MMOL/L (ref 3.5–5.2)
PROT SERPL-MCNC: 7 G/DL (ref 6–8.5)
SODIUM SERPL-SCNC: 143 MMOL/L (ref 134–144)
TRIGL SERPL-MCNC: 148 MG/DL (ref 0–149)
VLDLC SERPL CALC-MCNC: 30 MG/DL (ref 5–40)

## 2019-11-27 NOTE — PROGRESS NOTES
Cholesterol very elevated. This is strikingly worse than last year. If you were truly fasting (no calories for 8 hours) we should discuss addition of statin medication.

## 2019-12-04 DIAGNOSIS — I10 ESSENTIAL HYPERTENSION: ICD-10-CM

## 2019-12-04 RX ORDER — LISINOPRIL AND HYDROCHLOROTHIAZIDE 12.5; 2 MG/1; MG/1
TABLET ORAL
Qty: 45 TAB | Refills: 4 | Status: SHIPPED | OUTPATIENT
Start: 2019-12-04 | End: 2021-02-22

## 2019-12-06 NOTE — TELEPHONE ENCOUNTER
----- Message [id number]en sent at 12/5/2019 12:24 AM EST -----  Regarding: RE:elevated LDL  Contact: [phone]  I did Not eat anything after 9 PM the night before testing  ----- Message -----  From: Clair Oliva MD  Sent: 11/27/2019  3:15 PM EST  To: Catalino Rhoades  Subject: elevated LDL  Ms. Noa Daniel,   Your cholesterol labs are showing a very elevated LDL. Much increased from prior years. Had you eaten in the 8 hours prior to these labs? If so, lets repeat when you are fasting. If not, we should discuss adding a cholesterol medication.      Please reply back so I know how best to move forward,   All the best,   - Kapil Cramer

## 2019-12-09 RX ORDER — ATORVASTATIN CALCIUM 20 MG/1
20 TABLET, FILM COATED ORAL DAILY
Qty: 90 TAB | Refills: 1 | Status: SHIPPED | OUTPATIENT
Start: 2019-12-09 | End: 2020-12-22 | Stop reason: SDUPTHER

## 2019-12-09 NOTE — TELEPHONE ENCOUNTER
[facility]. Plan to consider an increase to 40mg if LDL not much improved (from 210) at follow-up.      Mary Veliz MD

## 2020-01-21 ENCOUNTER — HOSPITAL ENCOUNTER (OUTPATIENT)
Dept: MAMMOGRAPHY | Age: 60
Discharge: HOME | End: 2020-01-21
Attending: INTERNAL MEDICINE
Payer: COMMERCIAL

## 2020-01-21 DIAGNOSIS — Z12.39 SCREENING FOR BREAST CANCER: ICD-10-CM

## 2020-01-21 PROCEDURE — 77063 BREAST TOMOSYNTHESIS BI: CPT

## 2020-12-01 ENCOUNTER — OFFICE VISIT (OUTPATIENT)
Dept: URGENT CARE | Age: 60
End: 2020-12-01
Payer: COMMERCIAL

## 2020-12-01 VITALS — HEART RATE: 60 BPM | TEMPERATURE: 98.4 F | OXYGEN SATURATION: 96 % | RESPIRATION RATE: 15 BRPM

## 2020-12-01 DIAGNOSIS — Z20.822 EXPOSURE TO COVID-19 VIRUS: Primary | ICD-10-CM

## 2020-12-01 PROCEDURE — 99202 OFFICE O/P NEW SF 15 MIN: CPT | Performed by: NURSE PRACTITIONER

## 2020-12-01 NOTE — PROGRESS NOTES
Subjective: (As above and below)       This patient was seen in Flu Clinic at [address] [place] [address] Urgent Care while in their vehicle due to COVID-19 pandemic with PPE and focused examination in order to decrease community viral transmission. The patient/guardian gave verbal consent to treat. Chief Complaint   Patient presents with    Concern For COVID-19 (Coronavirus)     exposure     Greta Aguilar is a 62 y.o. female who presents for COVID-19 Exposure and testing. Known contact with: positive individual  Currently has not tried any therapies and denies any symptoms at this point in time. Feels well otherwise. Recent travel: no  Known Exposure to COVID-19: YES  Known flu or strep contact: no         ROS- negative for dizziness, cough, SOB, rhinorrhea, myalgias, n/v/d, rashes, headaches, fevers, chills, chest pains. Review of Systems - negative except as listed above    Reviewed PmHx, RxHx, FmHx, SocHx, AllgHx and updated in chart. Family History   Problem Relation Age of Onset    Cancer Mother     Breast Cancer Mother 80    Cancer Sister     Breast Cancer Sister 48    Heart Disease Father         heart attack in his 66's       Past Medical History:   Diagnosis Date    Achilles tendinitis of right lower extremity 3/13/2017    Menopause     LMP-65years old? Social History     Socioeconomic History    Marital status: SINGLE     Spouse name: Not on file    Number of children: Not on file    Years of education: Not on file    Highest education level: Not on file   Tobacco Use    Smoking status: Never Smoker    Smokeless tobacco: Never Used   Substance and Sexual Activity    Alcohol use: No    Drug use: No          Current Outpatient Medications   Medication Sig    lisinopril-hydroCHLOROthiazide (PRINZIDE, ZESTORETIC) 20-12.5 mg per tablet TAKE ONE-HALF TABLET BY MOUTH DAILY    atorvastatin (LIPITOR) 20 mg tablet Take 1 Tab by mouth daily.      No current facility-administered medications for this visit. Objective:     Vitals:    12/01/20 1038   Pulse: 60   Resp: 15   Temp: 98.4 °F (36.9 °C)   SpO2: 96%       Physical Exam  General appearance  appears well hydrated and does not appear toxic, no acute distress  Eyes - EOMs intact. Non injected. No scleral icterus   Ears - no external swelling  Nose - No purulent drainage  Neck/Lymphatics  trachea midline, full AROM  Chest - normal breathing effort. No active cough heard. No audible wheezing. Heart - HR-see vitals  Skin - no observable rashes or pallor  Neurologic- alert and oriented x 3  Psychiatric- normal mood, behavior and though content. Assessment/ Plan:     1. Exposure to COVID-19 virus    - NOVEL CORONAVIRUS (COVID-19)      Will test for COVID-19 given exposure  Supportive home care reviewed for any development of mild symptoms - tylenol, maintain adequate fluid intake, deep breathing exercises  Self isolate/quarantine advised based on recommendations in current CDC guidelines. Follow up: We have reviewed, in detail, particular presentations/worsening/concerning signs and symptoms that may warrant seeking immediate care in the ED setting and patient verbalized being aware of what to watch for. For other non-severe changes, non-improvement or questions, patient aware to contact PCP office or consider a virtual online medical consultation.         Honey Schreiber NP

## 2020-12-03 LAB — SARS-COV-2, NAA: NOT DETECTED

## 2020-12-21 ENCOUNTER — OFFICE VISIT (OUTPATIENT)
Dept: INTERNAL MEDICINE CLINIC | Age: 60
End: 2020-12-21

## 2020-12-21 NOTE — PROGRESS NOTES
Patient with new congestion in last 24 hours. Headaches x1 month. She thinks congestion is allergies. Escorted to side door. Outdoors discussed completing a follow-up visit tomorrow virtually after 3 pm.     Will cancel this visit.      Malena Rivas MD

## 2020-12-22 ENCOUNTER — VIRTUAL VISIT (OUTPATIENT)
Dept: INTERNAL MEDICINE CLINIC | Age: 60
End: 2020-12-22
Payer: COMMERCIAL

## 2020-12-22 DIAGNOSIS — E78.2 MIXED HYPERLIPIDEMIA: ICD-10-CM

## 2020-12-22 DIAGNOSIS — F45.41 STRESS HEADACHES: Primary | ICD-10-CM

## 2020-12-22 PROCEDURE — 99213 OFFICE O/P EST LOW 20 MIN: CPT | Performed by: INTERNAL MEDICINE

## 2020-12-22 RX ORDER — AMITRIPTYLINE HYDROCHLORIDE 25 MG/1
25 TABLET, FILM COATED ORAL
Qty: 30 TAB | Refills: 0 | Status: SHIPPED | OUTPATIENT
Start: 2020-12-22 | End: 2022-01-31

## 2020-12-22 RX ORDER — ATORVASTATIN CALCIUM 20 MG/1
20 TABLET, FILM COATED ORAL DAILY
Qty: 90 TAB | Refills: 0 | Status: SHIPPED | OUTPATIENT
Start: 2020-12-22 | End: 2021-04-02

## 2020-12-22 NOTE — PROGRESS NOTES
Jalil Miller is a 62 y.o. female who was seen by synchronous (real-time) audio-video technology on 12/22/2020. Consent: Jalil Miller, who was seen by synchronous (real-time) audio-video technology, and/or her healthcare decision maker, is aware that this patient-initiated, Telehealth encounter on 12/22/2020 is a billable service, with coverage as determined by her insurance carrier. She is aware that she may receive a bill and has provided verbal consent to proceed: Yes. Assessment & Plan:   Diagnoses and all orders for this visit:    1. Stress headaches  -     amitriptyline (ELAVIL) 25 mg tablet; Take 1 Tab by mouth nightly. 2. Mixed hyperlipidemia  -     atorvastatin (LIPITOR) 20 mg tablet; Take 1 Tab by mouth daily. recurrent headache during daytime at back of head. No redflag symptoms. Not overusing analgesics. - add amitriptyline at night to help with sleep/stress and headaches. - follow-up in office as scheduled. Recommended getting flu vaccine at [facility]    Continue atorvastatin. Will assess other medications at [facility] [place] visit        I look forward to seeing you at your next appointment. Future Appointments   Date Time Provider Michelle Mcdonald   1/7/2021  2:30 PM Janki Bledsoe MD [facility]     (Please also see details in patient instructions)  Subjective: Jalil Miller is a 62 y.o. female who was seen for No chief complaint on file. 62year old woman with headaches. Reports she goes through [place] lot of stress at my job\" . 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 Not following guidelines. 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 Reports that she is sleeping well. Feels tired, has 2 dogs. Congestion from yesterday is resolved. Headaches started around back of head, at the base where her neck is.    Yesterday took 2 ibuprofen. - no vertigo, no dizziness. - no nausea no vomitting. Occurring most days. Responds to ibuprofen. Recently started drinking sodas some. Had some alcohol 1 time this month. Prior to Admission medications    Medication Sig Start Date End Date Taking? Authorizing Provider   atorvastatin (LIPITOR) 20 mg tablet Take 1 Tab by mouth daily. 12/9/19   Grayson Pulido MD   lisinopril-hydroCHLOROthiazide (Marinell Karvonen) 20-12.5 mg per tablet TAKE ONE-HALF TABLET BY MOUTH DAILY 12/4/19   Grayson Pulido MD     No Known Allergies    Social History     Tobacco Use    Smoking status: Never Smoker    Smokeless tobacco: Never Used   Substance Use Topics    Alcohol use: No     Review of Systems   Constitutional: Negative for chills, fever, malaise/fatigue and weight loss. HENT: Negative for congestion. Eyes: Negative for blurred vision and photophobia. Respiratory: Negative for cough, shortness of breath and wheezing. Cardiovascular: Negative for chest pain and leg swelling. Gastrointestinal: Negative for abdominal pain, diarrhea, nausea and vomiting. Genitourinary: Negative for dysuria. Skin: Negative for rash. Neurological: Positive for headaches. Negative for dizziness.      Objective:   Vital Signs: (As obtained by patient/caregiver at home)  Visit Vitals  LMP  (LMP Unknown)        PHYSICAL EXAMINATION:    Constitutional: [x] Appears well-developed and well-nourished [x] No apparent distress      Mental status: [x] Alert and awake  [x] Oriented to person/place/time [x] Able to follow commands      Eyes:   EOM    [x]  Normal      Sclera  [x]  Normal              Discharge [x]  None visible       HENT: [x] Normocephalic, atraumatic    [x] Mouth/Throat: Mucous membranes are moist    External Ears [x] Normal      Neck: [x] No visualized mass     Pulmonary/Chest: [x] Respiratory effort normal   [x] No visualized signs of difficulty breathing or respiratory distress         Musculoskeletal:   [x] Normal gait with no signs of ataxia         [x] Normal range of motion of neck        Neurological:        [x] No Facial Asymmetry (Cranial nerve 7 motor function) (limited exam due to video visit)          [x] No gaze palsy              Skin:        [x] No significant exanthematous lesions or discoloration noted on facial skin              Psychiatric:       [x] Normal Affect        [x] Normal behavior    Other pertinent observable physical exam findings:- none      We discussed the expected course, resolution and complications of the diagnosis(es) in detail. Medication risks, benefits, costs, interactions, and alternatives were discussed as indicated. I advised her to contact the office if her condition worsens, changes or fails to improve as anticipated. She expressed understanding with the diagnosis(es) and plan. Janet Carey is a 62 y.o. female who was evaluated by a video visit encounter for concerns as above. Patient identification was verified prior to start of the visit. A caregiver was present when appropriate. Due to this being a TeleHealth encounter (During [facility] public health emergency), evaluation of the following organ systems was limited: Vitals/Constitutional/EENT/Resp/CV/GI//MS/Neuro/Skin/Heme-Lymph-Imm. Pursuant to the emergency declaration under the [facility], 1135 waiver authority and the [employer] Act, this Virtual  Visit was conducted, with patient's (and/or legal guardian's) consent, to reduce the patient's risk of exposure to COVID-19 and provide necessary medical care. Services were provided through a video synchronous discussion virtually to substitute for in-person clinic visit. Patient was located at their home. Provider was in office.        Abhijit Romero MD

## 2020-12-24 NOTE — PATIENT INSTRUCTIONS
Tension Headache: Care Instructions Your Care Instructions Most headaches are tension headaches. These headaches tend to happen again, especially if you are under stress. A tension headache may cause pain or a feeling of pressure all over your head. You probably can't pinpoint the center of the pain. If you keep getting tension headaches, the best thing you can do to limit them is to find out what is causing them and then make changes in those areas. Follow-up care is a key part of your treatment and safety. Be sure to make and go to all appointments, and call your doctor if you are having problems. It's also a good idea to know your test results and keep a list of the medicines you take. How can you care for yourself at home? · Rest in a quiet, dark room with a cool cloth on your forehead until your headache is gone. Close your eyes, and try to relax or go to sleep. Don't watch TV or read. Avoid using the computer. · Use a warm, moist towel or a heating pad set on low to relax tight shoulder and neck muscles. · Have someone gently massage your neck and shoulders. · Take pain medicines exactly as directed. ? If the doctor gave you a prescription medicine for pain, take it as prescribed. ? If you are not taking a prescription pain medicine, ask your doctor if you can take an over-the-counter medicine. · Be careful not to take pain medicine more often than the instructions allow, because you may get worse or more frequent headaches when the medicine wears off. · If you get another tension headache, stop what you are doing and sit quietly for a moment. Close your eyes and breathe slowly. Try to relax your head and neck muscles. · Do not ignore new symptoms that occur with a headache, such as fever, weakness or numbness, vision changes, or confusion. These may be signs of a more serious problem. To help prevent headaches · Keep a headache diary so you can figure out what triggers your headaches. Avoiding triggers may help you prevent headaches. Record when each headache began, how long it lasted, and what the pain was like (throbbing, aching, stabbing, or dull). List anything that may have triggered the headache, such as being physically or emotionally stressed or being anxious or depressed. Other possible triggers are hunger, anger, fatigue, poor posture, and muscle strain. · Find healthy ways to deal with stress. Headaches are most common during or right after stressful times. Take time to relax before and after you do something that has caused a headache in the past. 
· Exercise daily to relieve stress. Relaxation exercises may help reduce tension. · Get plenty of sleep. · Eat regularly and well. Long periods without food can trigger a headache. · Treat yourself to a massage. Some people find that massages are very helpful in relieving tension. · Try to keep your muscles relaxed by keeping good posture. Check your jaw, face, neck, and shoulder muscles for tension, and try to relax them. When sitting at a desk, change positions often, and stretch for 30 seconds each hour. · Reduce eyestrain from computers by blinking frequently and looking away from the computer screen every so often. Make sure you have proper eyewear and that your monitor is set up properly, about an arm's length away. When should you call for help? Call 911 anytime you think you may need emergency care. For example, call if: 
  · You have signs of a stroke. These may include: 
? Sudden numbness, paralysis, or weakness in your face, arm, or leg, especially on only one side of your body. ? Sudden vision changes. ? Sudden trouble speaking. ? Sudden confusion or trouble understanding simple statements. ? Sudden problems with walking or balance. ? A sudden, severe headache that is different from past headaches. Call your doctor now or seek immediate medical care if: 
  · You have new or worse nausea and vomiting.  
  · You have a new or higher fever.  
  · Your headache gets much worse. Watch closely for changes in your health, and be sure to contact your doctor if: 
  · You are not getting better after 2 days (48 hours). Where can you learn more? Go to http://www.gray.com/ Enter 84 17 85 in the search box to learn more about \"Tension Headache: Care Instructions. \" Current as of: November 20, 2019               Content Version: 12.6 © 8400-8443 [employer]. Care instructions adapted under license by [employer] (which disclaims liability or warranty for this information). If you have questions about a medical condition or this instruction, always ask your healthcare professional. [address] any warranty or liability for your use of this information.

## 2020-12-30 ENCOUNTER — TRANSCRIBE ORDER (OUTPATIENT)
Dept: SCHEDULING | Age: 60
End: 2020-12-30

## 2020-12-30 DIAGNOSIS — Z12.31 VISIT FOR SCREENING MAMMOGRAM: Primary | ICD-10-CM

## 2021-01-07 ENCOUNTER — OFFICE VISIT (OUTPATIENT)
Dept: INTERNAL MEDICINE CLINIC | Age: 61
End: 2021-01-07
Payer: COMMERCIAL

## 2021-01-07 VITALS
DIASTOLIC BLOOD PRESSURE: 79 MMHG | OXYGEN SATURATION: 95 % | HEIGHT: 66 IN | TEMPERATURE: 98.2 F | HEART RATE: 73 BPM | BODY MASS INDEX: 37.77 KG/M2 | WEIGHT: 235 LBS | SYSTOLIC BLOOD PRESSURE: 127 MMHG

## 2021-01-07 DIAGNOSIS — E66.01 SEVERE OBESITY (HCC): ICD-10-CM

## 2021-01-07 DIAGNOSIS — E78.2 MIXED HYPERLIPIDEMIA: ICD-10-CM

## 2021-01-07 DIAGNOSIS — Z23 ENCOUNTER FOR IMMUNIZATION: ICD-10-CM

## 2021-01-07 DIAGNOSIS — Z12.11 SCREEN FOR COLON CANCER: ICD-10-CM

## 2021-01-07 DIAGNOSIS — I10 ESSENTIAL HYPERTENSION: ICD-10-CM

## 2021-01-07 DIAGNOSIS — Z00.00 WELL WOMAN EXAM (NO GYNECOLOGICAL EXAM): Primary | ICD-10-CM

## 2021-01-07 PROCEDURE — 90750 HZV VACC RECOMBINANT IM: CPT | Performed by: INTERNAL MEDICINE

## 2021-01-07 PROCEDURE — 90471 IMMUNIZATION ADMIN: CPT | Performed by: INTERNAL MEDICINE

## 2021-01-07 PROCEDURE — 99396 PREV VISIT EST AGE 40-64: CPT | Performed by: INTERNAL MEDICINE

## 2021-01-07 NOTE — PATIENT INSTRUCTIONS
Well Visit, Women 48 to 72: Care Instructions Your Care Instructions Physical exams can help you stay healthy. Your doctor has checked your overall health and may have suggested ways to take good care of yourself. He or she also may have recommended tests. At home, you can help prevent illness with healthy eating, regular exercise, and other steps. Follow-up care is a key part of your treatment and safety. Be sure to make and go to all appointments, and call your doctor if you are having problems. It's also a good idea to know your test results and keep a list of the medicines you take. How can you care for yourself at home? · Reach and stay at a healthy weight. This will lower your risk for many problems, such as obesity, diabetes, heart disease, and high blood pressure. · Get at least 30 minutes of exercise on most days of the week. Walking is a good choice. You also may want to do other activities, such as running, swimming, cycling, or playing tennis or team sports. · Do not smoke. Smoking can make health problems worse. If you need help quitting, talk to your doctor about stop-smoking programs and medicines. These can increase your chances of quitting for good. · Protect your skin from too much sun. When you're outdoors from 10 a.m. to 4 p.m., stay in the shade or cover up with clothing and a hat with a wide brim. Wear sunglasses that block UV rays. Even when it's cloudy, put broad-spectrum sunscreen (SPF 30 or higher) on any exposed skin. · See a dentist one or two times a year for checkups and to have your teeth cleaned. · Wear a seat belt in the car. Follow your doctor's advice about when to have certain tests. These tests can spot problems early. · Cholesterol. Your doctor will tell you how often to have this done based on your age, family history, or other things that can increase your risk for heart attack and stroke. · Blood pressure. Have your blood pressure checked during a routine doctor visit. Your doctor will tell you how often to check your blood pressure based on your age, your blood pressure results, and other factors. · Mammogram. Ask your doctor how often you should have a mammogram, which is an X-ray of your breasts. A mammogram can spot breast cancer before it can be felt and when it is easiest to treat. · Pap test and pelvic exam. Ask your doctor how often you should have a Pap test. You may not need to have a Pap test as often as you used to. · Vision. Have your eyes checked every year or two or as often as your doctor suggests. Some experts recommend that you have yearly exams for glaucoma and other age-related eye problems starting at age 48. · Hearing. Tell your doctor if you notice any change in your hearing. You can have tests to find out how well you hear. · Diabetes. Ask your doctor whether you should have tests for diabetes. · Colorectal cancer. Your risk for colorectal cancer gets higher as you get older. Some experts say that adults should start regular screening at age 48 and stop at age 76. Others say to start before age 48 or continue after age 76. Talk with your doctor about your risk and when to start and stop screening. · Thyroid disease. Talk to your doctor about whether to have your thyroid checked as part of a regular physical exam. Women have an increased chance of a thyroid problem. · Osteoporosis. You should begin tests for bone density at age 72. If you are younger than 72, ask your doctor whether you have factors that may increase your risk for this disease. You may want to have this test before age 72. · Heart attack and stroke risk. At least every 4 to 6 years, you should have your risk for heart attack and stroke assessed. Your doctor uses factors such as your age, blood pressure, cholesterol, and whether you smoke or have diabetes to show what your risk for a heart attack or stroke is over the next 10 years. When should you call for help? Watch closely for changes in your health, and be sure to contact your doctor if you have any problems or symptoms that concern you. Where can you learn more? Go to http://www.gray.com/ Enter M247 in the search box to learn more about \"Well Visit, Women 50 to 72: Care Instructions. \" Current as of: May 27, 2020               Content Version: 12.6 © 1209-4602 [employer]. Care instructions adapted under license by [employer] (which disclaims liability or warranty for this information). If you have questions about a medical condition or this instruction, always ask your healthcare professional. [place] [address] any warranty or liability for your use of this information.

## 2021-01-07 NOTE — PROGRESS NOTES
RM 2    Chief Complaint   Patient presents with    Physical     Complete physical       Patent presents to clinic for complete physical.    Patient reports some concerns with her weight and cholesterol. 1. Have you been to the ER, urgent care clinic since your last visit? Hospitalized since your last visit? No    2. Have you seen or consulted any other health care providers outside of the [address] [place] [address] since your last visit? Include any pap smears or colon screening. No    Health Maintenance Due   Topic Date Due    Colorectal Cancer Screening Combo  08/28/2019    Shingrix Vaccine Age 50> (2 of 2) 01/21/2020    Flu Vaccine (1) 09/01/2020    Lipid Screen  11/26/2020    Breast Cancer Screen Mammogram  01/21/2021       Learning Assessment 11/24/2015   PRIMARY LEARNER Patient   HIGHEST LEVEL OF EDUCATION - PRIMARY LEARNER  2 YEARS OF COLLEGE   BARRIERS PRIMARY LEARNER NONE   PRIMARY LANGUAGE ENGLISH   LEARNER PREFERENCE PRIMARY DEMONSTRATION   ANSWERED BY patient   RELATIONSHIP SELF     Recent Travel Screening and Travel History documentation     Travel Screening     Question   Response    In the last month, have you been in contact with someone who was confirmed or suspected to have Coronavirus / COVID-19? No / Unsure    Have you had a COVID-19 viral test in the last 14 days? No    Do you have any of the following new or worsening symptoms? None of these    Have you traveled internationally in the last month? No      Travel History   Travel since 12/07/20     No documented travel since 12/07/20            3 most recent PHQ Screens 1/7/2021   Little interest or pleasure in doing things Not at all   Feeling down, depressed, irritable, or hopeless Not at all   Total Score PHQ 2 0       Received flu vaccination 1/6/2021    After obtaining consent, and per orders of Dr. Yani Hicks, injection of Shingrix given by Maru Clarke.  Patient instructed to remain in clinic for 20 minutes afterwards, and to report any adverse reaction to me immediately. Patient tolerated well. AVS  education, follow up, and recommendations provided and addressed with patient.   services used to advise patient No.

## 2021-01-07 NOTE — PROGRESS NOTES
A/P:  Yumi Lantigua is a 62 y.o. female, she presents today for:      ICD-10-CM ICD-9-CM    1. Well woman exam (no gynecological exam)  H76.96 R42.9 METABOLIC PANEL, COMPREHENSIVE      CBC WITH AUTOMATED DIFF      TSH 3RD GENERATION   2. Essential hypertension  Z90 380.6 METABOLIC PANEL, COMPREHENSIVE      CBC WITH AUTOMATED DIFF      TSH 3RD GENERATION   3. Mixed hyperlipidemia  E78.2 272.2 LIPID PANEL      METABOLIC PANEL, COMPREHENSIVE      TSH 3RD GENERATION   4. Severe obesity (HCC)  L00.27 798.45 METABOLIC PANEL, COMPREHENSIVE      CBC WITH AUTOMATED DIFF      TSH 3RD GENERATION   5. Encounter for immunization  Z23 V03.89 ZOSTER VACC RECOMBINANT ADJUVANTED   6. Screen for colon cancer  Z12.11 V76.51 OCCULT BLOOD IMMUNOASSAY,DIAGNOSTIC       Well woman (non-gyn) exam: history and exam revealed issues as noted below. Cancer screening:    Breast: mammogram.    - cervical: cytology and hpv negative 12/1/2016, plan for 5 year repeat    - colon: FIT test ordered    - breast: mammogram ordered. For march  Vaccine status: influenza and shingles vaccine provided. Cardiovascular risk: BP well controlled, lipid screen requested. Bone health: daily vit D supplement. Diet and Exercise: not drinking sugary beverages.     HTN: BP well controlled. continue 1/2 tablet prinzide.        recurrent headache during daytime at back of head. No redflag symptoms. Not overusing analgesics. - no signficant change, has not yet started new medication. Does plan to try this. Follow-up and Dispositions    · Return for tomorrow for fasting labs, then in 6 weeks to follow-up headache. Future Appointments   Date Time Provider Michelle Mcdonald   3/11/2021  3:00 PM [facility]        HPI    Continues to have headaches, not as much. Patient thinks they are stress headaches. Feels better since taking elderberry and taking her medicines. \"sometimes I sleep great\".      PMH/PSH: reviewed and updated  Sochx/Famhx: reviewed and updated     All: No Known Allergies  Med:   Current Outpatient Medications   Medication Sig    atorvastatin (LIPITOR) 20 mg tablet Take 1 Tab by mouth daily.  lisinopril-hydroCHLOROthiazide (PRINZIDE, ZESTORETIC) 20-12.5 mg per tablet TAKE ONE-HALF TABLET BY MOUTH DAILY    amitriptyline (ELAVIL) 25 mg tablet Take 1 Tab by mouth nightly. No current facility-administered medications for this visit. ROS pertinent for the following:  Review of Systems   Constitutional: Negative for chills and fever. HENT: Negative for ear pain and sore throat. Eyes: Negative for blurred vision and double vision. Respiratory: Negative for cough. Cardiovascular: Negative for chest pain, palpitations and PND. Gastrointestinal: Negative for abdominal pain, constipation, diarrhea and vomiting. Neurological: Positive for headaches. Negative for dizziness. PE:  Blood pressure 127/79, pulse 73, temperature 98.2 °F (36.8 °C), height 5' 6\" (1.676 m), weight 235 lb (106.6 kg), SpO2 95 %. Body mass index is 37.93 kg/m². Physical Exam  Vitals signs and nursing note reviewed. Constitutional:       General: She is not in acute distress. Appearance: Normal appearance. HENT:      Head: Normocephalic and atraumatic. Nose: Nose normal.      Mouth/Throat:      Mouth: Mucous membranes are moist.   Eyes:      Extraocular Movements: Extraocular movements intact. Conjunctiva/sclera: Conjunctivae normal.      Pupils: Pupils are equal, round, and reactive to light. Neck:      Musculoskeletal: Normal range of motion and neck supple. Cardiovascular:      Rate and Rhythm: Normal rate and regular rhythm. Heart sounds: Normal heart sounds. Pulmonary:      Effort: Pulmonary effort is normal.      Breath sounds: Normal breath sounds. Musculoskeletal: Normal range of motion. Skin:     General: Skin is warm and dry.    Neurological:      Mental Status: She is alert and oriented to person, place, and time. Labs:   See addendum for interpretation of labs resulting after time of visit. No results found for any visits on 01/07/21. She was given AVS and expressed understanding with the diagnosis and plan as discussed.

## 2021-01-15 ENCOUNTER — LAB ONLY (OUTPATIENT)
Dept: INTERNAL MEDICINE CLINIC | Age: 61
End: 2021-01-15

## 2021-01-15 DIAGNOSIS — E78.2 MIXED HYPERLIPIDEMIA: ICD-10-CM

## 2021-01-15 DIAGNOSIS — E66.01 SEVERE OBESITY (HCC): ICD-10-CM

## 2021-01-15 DIAGNOSIS — I10 ESSENTIAL HYPERTENSION: ICD-10-CM

## 2021-01-15 DIAGNOSIS — Z00.00 WELL WOMAN EXAM (NO GYNECOLOGICAL EXAM): ICD-10-CM

## 2021-01-15 LAB
ALBUMIN SERPL-MCNC: 4.1 G/DL (ref 3.5–5)
ALBUMIN/GLOB SERPL: 1.2 {RATIO} (ref 1.1–2.2)
ALP SERPL-CCNC: 62 U/L (ref 45–117)
ALT SERPL-CCNC: 32 U/L (ref 12–78)
ANION GAP SERPL CALC-SCNC: 5 MMOL/L (ref 5–15)
AST SERPL-CCNC: 20 U/L (ref 15–37)
BASOPHILS # BLD: 0 K/UL (ref 0–0.1)
BASOPHILS NFR BLD: 0 % (ref 0–1)
BILIRUB SERPL-MCNC: 0.3 MG/DL (ref 0.2–1)
BUN SERPL-MCNC: 16 MG/DL (ref 6–20)
BUN/CREAT SERPL: 21 (ref 12–20)
CALCIUM SERPL-MCNC: 9.3 MG/DL (ref 8.5–10.1)
CHLORIDE SERPL-SCNC: 106 MMOL/L (ref 97–108)
CHOLEST SERPL-MCNC: 176 MG/DL
CO2 SERPL-SCNC: 31 MMOL/L (ref 21–32)
CREAT SERPL-MCNC: 0.78 MG/DL (ref 0.55–1.02)
DIFFERENTIAL METHOD BLD: NORMAL
EOSINOPHIL # BLD: 0.1 K/UL (ref 0–0.4)
EOSINOPHIL NFR BLD: 1 % (ref 0–7)
ERYTHROCYTE [DISTWIDTH] IN BLOOD BY AUTOMATED COUNT: 12.6 % (ref 11.5–14.5)
GLOBULIN SER CALC-MCNC: 3.4 G/DL (ref 2–4)
GLUCOSE SERPL-MCNC: 78 MG/DL (ref 65–100)
HCT VFR BLD AUTO: 41.6 % (ref 35–47)
HDLC SERPL-MCNC: 66 MG/DL
HDLC SERPL: 2.7 {RATIO} (ref 0–5)
HGB BLD-MCNC: 13.3 G/DL (ref 11.5–16)
IMM GRANULOCYTES # BLD AUTO: 0 K/UL (ref 0–0.04)
IMM GRANULOCYTES NFR BLD AUTO: 0 % (ref 0–0.5)
LDLC SERPL CALC-MCNC: 92.8 MG/DL (ref 0–100)
LIPID PROFILE,FLP: NORMAL
LYMPHOCYTES # BLD: 1.7 K/UL (ref 0.8–3.5)
LYMPHOCYTES NFR BLD: 32 % (ref 12–49)
MCH RBC QN AUTO: 29.6 PG (ref 26–34)
MCHC RBC AUTO-ENTMCNC: 32 G/DL (ref 30–36.5)
MCV RBC AUTO: 92.4 FL (ref 80–99)
MONOCYTES # BLD: 0.6 K/UL (ref 0–1)
MONOCYTES NFR BLD: 11 % (ref 5–13)
NEUTS SEG # BLD: 3 K/UL (ref 1.8–8)
NEUTS SEG NFR BLD: 56 % (ref 32–75)
NRBC # BLD: 0 K/UL (ref 0–0.01)
NRBC BLD-RTO: 0 PER 100 WBC
PLATELET # BLD AUTO: 289 K/UL (ref 150–400)
PMV BLD AUTO: 10.3 FL (ref 8.9–12.9)
POTASSIUM SERPL-SCNC: 4.1 MMOL/L (ref 3.5–5.1)
PROT SERPL-MCNC: 7.5 G/DL (ref 6.4–8.2)
RBC # BLD AUTO: 4.5 M/UL (ref 3.8–5.2)
SODIUM SERPL-SCNC: 142 MMOL/L (ref 136–145)
TRIGL SERPL-MCNC: 86 MG/DL (ref ?–150)
TSH SERPL DL<=0.05 MIU/L-ACNC: 2.45 UIU/ML (ref 0.36–3.74)
VLDLC SERPL CALC-MCNC: 17.2 MG/DL
WBC # BLD AUTO: 5.3 K/UL (ref 3.6–11)

## 2021-01-15 NOTE — PROGRESS NOTES
LDL cholesterol is much improved compared to prior measurements. Continue atorvastatin 20mg. Remainder of labs also good including:   - CMP: normal liver and kidney function   - CBC: normal blood counts. - TSH: normal range thyroid hormone.

## 2021-01-22 LAB — HEMOCCULT STL QL IA: NEGATIVE

## 2021-01-22 NOTE — PROGRESS NOTES
Fit test negative. Low risk result for colon cancer. Plan for repeat in 12 months. Congratulations. WDL

## 2021-02-19 DIAGNOSIS — I10 ESSENTIAL HYPERTENSION: ICD-10-CM

## 2021-02-22 RX ORDER — LISINOPRIL AND HYDROCHLOROTHIAZIDE 12.5; 2 MG/1; MG/1
TABLET ORAL
Qty: 45 TAB | Refills: 3 | Status: SHIPPED | OUTPATIENT
Start: 2021-02-22 | End: 2022-01-31 | Stop reason: SDUPTHER

## 2021-03-11 ENCOUNTER — HOSPITAL ENCOUNTER (OUTPATIENT)
Dept: MAMMOGRAPHY | Age: 61
Discharge: HOME | End: 2021-03-11
Attending: INTERNAL MEDICINE
Payer: COMMERCIAL

## 2021-03-11 DIAGNOSIS — Z12.31 VISIT FOR SCREENING MAMMOGRAM: ICD-10-CM

## 2021-03-11 PROCEDURE — 77063 BREAST TOMOSYNTHESIS BI: CPT

## 2021-04-02 DIAGNOSIS — E78.2 MIXED HYPERLIPIDEMIA: ICD-10-CM

## 2021-04-02 RX ORDER — ATORVASTATIN CALCIUM 20 MG/1
TABLET, FILM COATED ORAL
Qty: 90 TAB | Refills: 4 | Status: SHIPPED | OUTPATIENT
Start: 2021-04-02 | End: 2021-08-18 | Stop reason: SDUPTHER

## 2021-08-18 ENCOUNTER — PATIENT MESSAGE (OUTPATIENT)
Dept: INTERNAL MEDICINE CLINIC | Age: 61
End: 2021-08-18

## 2021-08-18 DIAGNOSIS — E78.2 MIXED HYPERLIPIDEMIA: ICD-10-CM

## 2021-08-18 NOTE — TELEPHONE ENCOUNTER
Writer sent pt a message via [facility] regarding a request from [facility] for a new prescription. [facility] is requesting a new prescription via fax on behalf of the pt. Previous prescription was sent to [facility]. Last visit 01/07/2021 [facility]   Next appointment Nothing scheduled   Previous refill encounter(s)   04/02/2021 Lipitor #[id number] with 4 refills     Requested Prescriptions     Pending Prescriptions Disp Refills    atorvastatin (LIPITOR) 20 mg tablet 90 Tablet 1     Sig: Take 1 Tablet by mouth daily.

## 2021-08-19 NOTE — TELEPHONE ENCOUNTER
Pt left a voice message in response to [facility] message requesting a new prescription for Lipitor to be sent to [facility].

## 2021-08-20 RX ORDER — ATORVASTATIN CALCIUM 20 MG/1
20 TABLET, FILM COATED ORAL DAILY
Qty: 90 TABLET | Refills: 1 | Status: SHIPPED | OUTPATIENT
Start: 2021-08-20 | End: 2022-01-31 | Stop reason: SDUPTHER

## 2021-12-09 LAB — SARS-COV-2, NAA: POSITIVE

## 2022-01-18 ENCOUNTER — TELEPHONE (OUTPATIENT)
Dept: INTERNAL MEDICINE CLINIC | Age: 62
End: 2022-01-18

## 2022-01-18 DIAGNOSIS — G89.29 CHRONIC LEFT SHOULDER PAIN: Primary | ICD-10-CM

## 2022-01-18 DIAGNOSIS — M25.512 CHRONIC LEFT SHOULDER PAIN: Primary | ICD-10-CM

## 2022-01-18 NOTE — TELEPHONE ENCOUNTER
----- Message from Blanka Hansa sent at 1/12/2022  2:49 PM EST -----  Subject: Message to Provider    QUESTIONS  Information for Provider? Patient is having left arm muscle pain and she   wants to get it looked at as she feels she pulled something. She has an   appointment scheduled on the 31st but wants to be seen sooner. Requesting   call back to see if this can be done.   ---------------------------------------------------------------------------  --------------  CALL BACK INFO  What is the best way for the office to contact you? OK to leave message on   voicemail  Preferred Call Back Phone Number? [phone]  ---------------------------------------------------------------------------  --------------  SCRIPT ANSWERS  Relationship to Patient?  Self

## 2022-01-18 NOTE — TELEPHONE ENCOUNTER
Called patient. Reports that daughter has a new puppy that is wild. Thinks it may have jerked on a leash. Increased pain when raising arm to put a shirt on. Pain has been ongoing for a couple of months. Reassured patient it does not sound cardiac in nature. Suggested evaluation with orthopedics.      Trish Mehta MD

## 2022-01-31 ENCOUNTER — OFFICE VISIT (OUTPATIENT)
Dept: INTERNAL MEDICINE CLINIC | Age: 62
End: 2022-01-31
Payer: COMMERCIAL

## 2022-01-31 ENCOUNTER — HOSPITAL ENCOUNTER (OUTPATIENT)
Dept: LAB | Age: 62
Discharge: HOME | End: 2022-01-31
Payer: COMMERCIAL

## 2022-01-31 ENCOUNTER — OFFICE VISIT (OUTPATIENT)
Dept: ORTHOPEDIC SURGERY | Age: 62
End: 2022-01-31
Payer: COMMERCIAL

## 2022-01-31 VITALS
DIASTOLIC BLOOD PRESSURE: 78 MMHG | OXYGEN SATURATION: 98 % | RESPIRATION RATE: 16 BRPM | SYSTOLIC BLOOD PRESSURE: 127 MMHG | WEIGHT: 218 LBS | HEART RATE: 71 BPM | TEMPERATURE: 97.6 F | BODY MASS INDEX: 35.03 KG/M2 | HEIGHT: 66 IN

## 2022-01-31 DIAGNOSIS — Z12.4 SCREENING FOR CERVICAL CANCER: ICD-10-CM

## 2022-01-31 DIAGNOSIS — Z12.11 SCREEN FOR COLON CANCER: ICD-10-CM

## 2022-01-31 DIAGNOSIS — B37.2 YEAST DERMATITIS: ICD-10-CM

## 2022-01-31 DIAGNOSIS — E78.2 MIXED HYPERLIPIDEMIA: ICD-10-CM

## 2022-01-31 DIAGNOSIS — E66.01 SEVERE OBESITY (HCC): ICD-10-CM

## 2022-01-31 DIAGNOSIS — M25.512 ACUTE PAIN OF LEFT SHOULDER: Primary | ICD-10-CM

## 2022-01-31 DIAGNOSIS — I10 ESSENTIAL HYPERTENSION: ICD-10-CM

## 2022-01-31 DIAGNOSIS — Z23 ENCOUNTER FOR IMMUNIZATION: ICD-10-CM

## 2022-01-31 DIAGNOSIS — M75.02 ADHESIVE CAPSULITIS OF LEFT SHOULDER: ICD-10-CM

## 2022-01-31 DIAGNOSIS — E55.9 VITAMIN D DEFICIENCY: ICD-10-CM

## 2022-01-31 DIAGNOSIS — Z01.419 WELL WOMAN EXAM WITH ROUTINE GYNECOLOGICAL EXAM: Primary | ICD-10-CM

## 2022-01-31 DIAGNOSIS — M77.8 LEFT SHOULDER TENDINITIS: ICD-10-CM

## 2022-01-31 DIAGNOSIS — Z12.31 ENCOUNTER FOR SCREENING MAMMOGRAM FOR MALIGNANT NEOPLASM OF BREAST: ICD-10-CM

## 2022-01-31 PROCEDURE — 99214 OFFICE O/P EST MOD 30 MIN: CPT | Performed by: INTERNAL MEDICINE

## 2022-01-31 PROCEDURE — 88175 CYTOPATH C/V AUTO FLUID REDO: CPT

## 2022-01-31 PROCEDURE — 87624 HPV HI-RISK TYP POOLED RSLT: CPT

## 2022-01-31 PROCEDURE — 99203 OFFICE O/P NEW LOW 30 MIN: CPT | Performed by: ORTHOPAEDIC SURGERY

## 2022-01-31 PROCEDURE — 99396 PREV VISIT EST AGE 40-64: CPT | Performed by: INTERNAL MEDICINE

## 2022-01-31 RX ORDER — NYSTATIN 100000 U/G
OINTMENT TOPICAL 2 TIMES DAILY
Qty: 30 G | Refills: 0 | Status: SHIPPED | OUTPATIENT
Start: 2022-01-31 | End: 2022-02-14

## 2022-01-31 RX ORDER — ATORVASTATIN CALCIUM 20 MG/1
20 TABLET, FILM COATED ORAL DAILY
Qty: 90 TABLET | Refills: 4 | Status: SHIPPED | OUTPATIENT
Start: 2022-01-31

## 2022-01-31 RX ORDER — LISINOPRIL AND HYDROCHLOROTHIAZIDE 12.5; 2 MG/1; MG/1
TABLET ORAL
Qty: 45 TABLET | Refills: 4 | Status: SHIPPED | OUTPATIENT
Start: 2022-01-31

## 2022-01-31 RX ORDER — ACETAMINOPHEN 500 MG
2000 TABLET ORAL DAILY
Qty: 90 CAPSULE | Refills: 4 | Status: SHIPPED | OUTPATIENT
Start: 2022-01-31

## 2022-01-31 NOTE — PATIENT INSTRUCTIONS
Information for scheduling covid-19 phizer vaccines    For local pharmacies and other locations to schedule  a vaccine, please visit:     Vaccines. gov or vacunas. gov         Well Visit, Women 48 to 72: Care Instructions  Overview     Well visits can help you stay healthy. Your doctor has checked your overall health and may have suggested ways to take good care of yourself. Your doctor also may have recommended tests. At home, you can help prevent illness with healthy eating, regular exercise, and other steps. Follow-up care is a key part of your treatment and safety. Be sure to make and go to all appointments, and call your doctor if you are having problems. It's also a good idea to know your test results and keep a list of the medicines you take. How can you care for yourself at home? · Get screening tests that you and your doctor decide on. Screening helps find diseases before any symptoms appear. · Eat healthy foods. Choose fruits, vegetables, whole grains, protein, and low-fat dairy foods. Limit fat, especially saturated fat. Reduce salt in your diet. · Limit alcohol. Have no more than 1 drink a day or 7 drinks a week. · Get at least 30 minutes of exercise on most days of the week. Walking is a good choice. You also may want to do other activities, such as running, swimming, cycling, or playing tennis or team sports. · Reach and stay at a healthy weight. This will lower your risk for many problems, such as obesity, diabetes, heart disease, and high blood pressure. · Do not smoke. Smoking can make health problems worse. If you need help quitting, talk to your doctor about stop-smoking programs and medicines. These can increase your chances of quitting for good. · Care for your mental health. It is easy to get weighed down by worry and stress. Learn strategies to manage stress, like deep breathing and mindfulness, and stay connected with your family and community.  If you find you often feel sad or hopeless, talk with your doctor. Treatment can help. · Talk to your doctor about whether you have any risk factors for sexually transmitted infections (STIs). You can help prevent STIs if you wait to have sex with a new partner (or partners) until you've each been tested for STIs. It also helps if you use condoms (male or female condoms) and if you limit your sex partners to one person who only has sex with you. Vaccines are available for some STIs. · If you think you may have a problem with alcohol or drug use, talk to your doctor. This includes prescription medicines (such as amphetamines and opioids) and illegal drugs (such as cocaine and methamphetamine). Your doctor can help you figure out what type of treatment is best for you. · Protect your skin from too much sun. When you're outdoors from 10 a.m. to 4 p.m., stay in the shade or cover up with clothing and a hat with a wide brim. Wear sunglasses that block UV rays. Even when it's cloudy, put broad-spectrum sunscreen (SPF 30 or higher) on any exposed skin. · See a dentist one or two times a year for checkups and to have your teeth cleaned. · Wear a seat belt in the car. When should you call for help? Watch closely for changes in your health, and be sure to contact your doctor if you have any problems or symptoms that concern you. Where can you learn more? Go to http://www.gray.com/  Enter [id number] in the search box to learn more about \"Well Visit, Women 50 to 72: Care Instructions. \"  Current as of: February 11, 2021               Content Version: 13.0  © 2076-9450 [employer] Incorporated. Care instructions adapted under license by [employer] (which disclaims liability or warranty for this information). If you have questions about a medical condition or this instruction, always ask your healthcare professional. [address] any warranty or liability for your use of this information.

## 2022-01-31 NOTE — PROGRESS NOTES
A/P:  Sandra Scales is a 62 y.o. female, she presents today for:    1. Well woman exam with routine gynecological exam  2. Encounter for screening mammogram for malignant neoplasm of breast  -     [facility] 3D ABIDA W MAMMO BI SCREENING INCL CAD; Future  3. Screen for colon cancer  -     OCCULT BLOOD IMMUNOASSAY,DIAGNOSTIC; Future  4. Encounter for immunization  -     INFLUENZA VIRUS VAC QUAD,SPLIT,PRESV FREE SYRINGE IM  5. Essential hypertension  -     METABOLIC PANEL, COMPREHENSIVE; Future  -     lisinopril-hydroCHLOROthiazide (PRINZIDE, ZESTORETIC) 20-12.5 mg per tablet; TAKE ONE-HALF (1/2) TABLET DAILY, Normal, Disp-45 Tablet, R-4  6. Severe obesity ([address]  7. Mixed hyperlipidemia  -     LIPID PANEL; Future  -     atorvastatin (LIPITOR) 20 mg tablet; Take 1 Tablet by mouth daily. , Normal, Disp-90 Tablet, R-4  8. Yeast dermatitis  -     nystatin (MYCOSTATIN) 100,000 unit/gram ointment; Apply  to affected area two (2) times a day for 14 days. Skin rash, Normal, Disp-30 g, R-0  9. Screening for cervical cancer  -     PAP IG, APTIMA HPV AND RFX 16/18,45 ([id number]); Future  10. Vitamin D deficiency    Well woman (non-gyn) exam: history and exam revealed issues as noted below. Cancer screening:    Breast:    - cervical: cytology and hpv negative 12/1/2016, plan for 5 year repeat    - colon: FIT test ordered    - breast: mammogram ordered. For march  Vaccine status: influenza. - states that she has not had covid vaccine. - reports that she had covid in December 2021.    - recommended covid vaccine  Cardiovascular risk: BP well controlled, on statin  Bone health: daily vit D supplement. Diet and Exercise: not drinking sugary beverages.     HTN: BP well controlled. continue 1/2 tablet prinzide.      recurrent headache: improved - per patient. Not on medication. 2-3 per month at the most. Thinks about it as more of a stress provlem. Obesity - losing weight.  Discussed aiming for regular exercise    History of vitamin d deficiency - taking a gummy vitamin with vitamin D. Future Appointments   Date Time Provider Michelle Mcdonald   1/31/2022 10:50 AM Victor Manuel Rey MD [facility]       HPI    Patient scheduled for acute care visit. However when she arrives states she would like a well woman with pap - defers arm pain to orthopedic specialist.    - last seen 1 year ago for htn, HLD and recurrent headache. Does not measure at home. - no lightheaded, no chest pain,  - no leg swelling. Gyn hx:    - has bee many years since menopause   - no vaginal bleeding   - no itching, nor pain. - patient declines STI screening    PMH/PSH: reviewed and updated  Sochx/Famhx: reviewed and updated     All: No Known Allergies  Med:   Current Outpatient Medications   Medication Sig    atorvastatin (LIPITOR) 20 mg tablet Take 1 Tablet by mouth daily.  lisinopril-hydroCHLOROthiazide (PRINZIDE, ZESTORETIC) 20-12.5 mg per tablet TAKE ONE-HALF (1/2) TABLET DAILY    amitriptyline (ELAVIL) 25 mg tablet Take 1 Tab by mouth nightly. (Patient not taking: Reported on 1/31/2022)     No current facility-administered medications for this visit. ROS pertinent for the following:  Review of Systems   Constitutional: Negative for chills, fever and malaise/fatigue. Respiratory: Negative for shortness of breath. Cardiovascular: Negative for chest pain. PE:  Blood pressure 127/78, pulse 71, temperature 97.6 °F (36.4 °C), temperature source Temporal, resp. rate 16, height 5' 6\" (1.676 m), weight 218 lb (98.9 kg), SpO2 98 %. Body mass index is 35.19 kg/m². Physical Exam  Vitals and nursing note reviewed. Constitutional:       General: She is not in acute distress. Appearance: Normal appearance. HENT:      Head: Normocephalic and atraumatic. Nose: Nose normal.      Mouth/Throat:      Mouth: Mucous membranes are moist.   Eyes:      Extraocular Movements: Extraocular movements intact.       Conjunctiva/sclera: Conjunctivae normal.      Pupils: Pupils are equal, round, and reactive to light. Cardiovascular:      Rate and Rhythm: Normal rate and regular rhythm. Heart sounds: Normal heart sounds. Pulmonary:      Effort: Pulmonary effort is normal.      Breath sounds: Normal breath sounds. Musculoskeletal:         General: Normal range of motion. Cervical back: Normal range of motion and neck supple. Skin:     General: Skin is warm and dry. Neurological:      Mental Status: She is alert and oriented to person, place, and time. Breasts-no new concern s- deferred - mammogram planned in 1 month  -[place] 5 with normal external labia minora and majora. Urethra normal without lesions. Vaginal mucosa wnl. Cervix is normal without lesions. On bimanual exam there is no uterine or adnexal masses/tenderness. Labs:   See addendum for interpretation of labs resulting after time of visit. She was given AVS and expressed understanding with the diagnosis and plan as discussed. An electronic signature was used to authenticate this note.   -- Federica Mcdaniels MD

## 2022-01-31 NOTE — Clinical Note
1/31/2022    Patient: Mable Hatfield   YOB: 1960   Date of Visit: 1/31/2022     Lord Kelsea MD  [address]  [place] [address]  [facility] [address]  Via     Dear MD Lord Kelsea [place], [place],      Thank you for referring Ms. Latonia Ochoa to [facility] for evaluation. My notes for this consultation are attached. If you have questions, please do not hesitate to call me. I look forward to following your patient along with you.       Sincerely,    Gordon Muniz MD

## 2022-01-31 NOTE — PROGRESS NOTES
RM 1    Chief Complaint   Patient presents with    Gyn Exam     pap, patient is not fasting for labs today        Visit Vitals  /78 (BP 1 Location: Left upper arm, BP Patient Position: Sitting, BP Cuff Size: Adult)   Pulse 71   Temp 97.6 °F (36.4 °C) (Temporal)   Resp 16   Ht 5' 6\" (1.676 m)   Wt 218 lb (98.9 kg)   SpO2 98%   BMI 35.19 kg/m²       3 most recent PHQ Screens 1/31/2022   Little interest or pleasure in doing things Not at all   Feeling down, depressed, irritable, or hopeless Not at all   Total Score PHQ 2 0         1. Have you been to the ER, urgent care clinic since your last visit? Hospitalized since your last visit? [facility]   2. Have you seen or consulted any other health care providers outside of the [address] [place] [address] since your last visit? Include any pap smears or colon screening. No    Health Maintenance Due   Topic Date Due    COVID-19 Vaccine (1) Never done    Flu Vaccine (1) 09/01/2021    Cervical cancer screen  12/01/2021    Colorectal Cancer Screening Combo  01/08/2022    Lipid Screen  01/15/2022       Learning Assessment 11/24/2015   PRIMARY LEARNER Patient   HIGHEST LEVEL OF EDUCATION - PRIMARY LEARNER  2 YEARS OF COLLEGE   BARRIERS PRIMARY LEARNER NONE   PRIMARY LANGUAGE ENGLISH   LEARNER PREFERENCE PRIMARY DEMONSTRATION   ANSWERED BY patient   RELATIONSHIP SELF       After obtaining consent, and per orders of Dr. Aida Hastings, injection of flu vaccine  given by Jacki Milton LPN. Patient instructed to remain in clinic for 20 minutes afterwards, and to report any adverse reaction to me immediately. Patient has not had covid 19 vaccines     AVS  education, follow up, and recommendations provided and addressed with patient.   services used to advise patient no

## 2022-01-31 NOTE — PROGRESS NOTES
Grisel Campa (: 1960) is a 62 y.o. female, patient, here for evaluation of the following chief complaint(s):  Shoulder Pain (left shoulder pain)       HPI:    Patient presents the office today with a chief complaint of left shoulder pain. Patient describes atraumatic onset discomfort. She points to the anterior aspect of the shoulder radiating down to the deltoid region. She denies numbness or tingling. She has recently started to develop some nighttime pain. She has also noted a diminished in her function and range of motion. She has not had a prior problem with the shoulder. She denies right shoulder pain. No Known Allergies    Current Outpatient Medications   Medication Sig    lisinopril-hydroCHLOROthiazide (PRINZIDE, ZESTORETIC) 20-12.5 mg per tablet TAKE ONE-HALF (1/2) TABLET DAILY    atorvastatin (LIPITOR) 20 mg tablet Take 1 Tablet by mouth daily.  nystatin (MYCOSTATIN) 100,000 unit/gram ointment Apply  to affected area two (2) times a day for 14 days. Skin rash    cholecalciferol (VITAMIN D3) (2,000 UNITS /50 MCG) cap capsule Take 1 Capsule by mouth daily. No current facility-administered medications for this visit. Past Medical History:   Diagnosis Date    Achilles tendinitis of right lower extremity 3/13/2017    Menopause     LMP-65years old?         Past Surgical History:   Procedure Laterality Date    HX GYN      3 cesearin births    HX HEENT Bilateral     laser surgery on both eyes    HX ORTHOPAEDIC Left     remove a bone spur    HX ORTHOPAEDIC Right     removal of bone spur and achilis tendon repair        Family History   Problem Relation Age of Onset    Cancer Mother     Breast Cancer Mother 80    Cancer Sister     Breast Cancer Sister 48    Heart Disease Father         heart attack in his 66's        Social History     Socioeconomic History    Marital status: SINGLE     Spouse name: Not on file    Number of children: Not on file    Years of education: Not on file    Highest education level: Not on file   Occupational History    Not on file   Tobacco Use    Smoking status: Never Smoker    Smokeless tobacco: Never Used   Substance and Sexual Activity    Alcohol use: No    Drug use: No    Sexual activity: Not on file   Other Topics Concern    Not on file   Social History Narrative    Not on file     Social Determinants of Health     Financial Resource Strain:     Difficulty of Paying Living Expenses: Not on file   Food Insecurity:     Worried About Running Out of Food in the Last Year: Not on file    Dwayne of Food in the Last Year: Not on file   Transportation Needs:     Lack of Transportation (Medical): Not on file    Lack of Transportation (Non-Medical): Not on file   Physical Activity:     Days of Exercise per Week: Not on file    Minutes of Exercise per Session: Not on file   Stress:     Feeling of Stress : Not on file   Social Connections:     Frequency of Communication with Friends and Family: Not on file    Frequency of Social Gatherings with Friends and Family: Not on file    Attends Worship Services: Not on file    Active Member of [address] [place] [address] or Organizations: Not on file    Attends Club or Organization Meetings: Not on file    Marital Status: Not on file   Intimate Partner Violence:     Fear of Current or Ex-Partner: Not on file    Emotionally Abused: Not on file    Physically Abused: Not on file    Sexually Abused: Not on file   Housing Stability:     Unable to Pay for Housing in the Last Year: Not on file    Number of [place] in the Last Year: Not on file    Unstable Housing in the Last Year: Not on file       Review of Systems   Musculoskeletal:        Left shoulder pain       Vitals:  LMP  (LMP Unknown)    There is no height or weight on file to calculate BMI. Ortho Exam     Left shoulder:  No shoulder girdle atrophy. There is no soft tissue swelling, ecchymosis, abrasions or lacerations.   Active range of motion of the shoulder is limited to 130° of forward flexion, 120° of lateral abduction and 70° of external rotation. Internal rotation is to the SI joint and is painful. Passive range of motion is full with a painful impingement sign and painful Copeland sign. Rotator cuff strength is painful to evaluate and is at 4+/5 with forward flexion and lateral abduction. External rotational strength is maintained. There is no crepitation about the joint. Palpation of the [facility] joint does not reproduce discomfort and there is no pain elicited with cross-body adduction. Strength of the extremity is 5/5 strength at biceps/triceps/wrist extension and is comparable to the contralateral side. DTR's are intact at +2/4 and are symmetrical.  Cervical range of motion is full with no pain to palpation along the paraspinal musculature or medial border of the scapula. Spurling's sign is negative. Right shoulder:  No shoulder girdle atrophy . There is no soft tissue swelling, ecchymosis, abrasions or lacerations. Active range of motion is full with forward flexion, lateral abduction and external rotation. Internal rotation is to the upper lumbar level with a negative lift-off sign. Passive range of motion is full with a negative impingement sign and a negative Copeland sign. Rotator cuff strength with forward flexion, lateral abduction and external rotation is intact with 5/5 strength. There is no crepitation about the joint. Palpation of the [facility] joint does not reproduce discomfort, and there is no pain elicited with cross-body adduction. Strength of the extremity is 5/5 at biceps/triceps/wrist extension. DRT's are intact at +2/4 and  symmetrical.  Cervical range of motion is full with no pain to palpation along the paraspinal musculature medial border of the scapula. Spurling's sign is negative.       XR Results (most recent):  Results from Appointment encounter on 01/31/22    XR SHOULDER LT AP/LAT MIN 2 V    Narrative  Three-view x-ray of the left shoulder reveals no fracture dislocation no signs of osteoarthritis           ASSESSMENT/PLAN:    I gone over the findings with the patient. She has developed a little bit of adhesive capsulitis and this most likely is the major cause of her pain. After going through different treatment options, we have elected to proceed with physical therapy. We have also discussed activity modification and appropriate use of over-the-counter anti-inflammatory medicine. Patient is to return to the office in 4 weeks if she is no better.         Sri Marques MD

## 2022-02-01 PROCEDURE — 90686 IIV4 VACC NO PRSV 0.5 ML IM: CPT | Performed by: INTERNAL MEDICINE

## 2022-02-01 PROCEDURE — 90471 IMMUNIZATION ADMIN: CPT | Performed by: INTERNAL MEDICINE

## 2022-02-09 LAB — HEMOCCULT STL QL IA: NEGATIVE

## 2022-02-10 ENCOUNTER — APPOINTMENT (OUTPATIENT)
Dept: INTERNAL MEDICINE CLINIC | Age: 62
End: 2022-02-10

## 2022-02-10 DIAGNOSIS — I10 ESSENTIAL HYPERTENSION: ICD-10-CM

## 2022-02-10 DIAGNOSIS — E78.2 MIXED HYPERLIPIDEMIA: ICD-10-CM

## 2022-02-11 LAB
ALBUMIN SERPL-MCNC: 4.2 G/DL (ref 3.5–5)
ALBUMIN/GLOB SERPL: 1.4 {RATIO} (ref 1.1–2.2)
ALP SERPL-CCNC: 68 U/L (ref 45–117)
ALT SERPL-CCNC: 26 U/L (ref 12–78)
ANION GAP SERPL CALC-SCNC: 3 MMOL/L (ref 5–15)
AST SERPL-CCNC: 22 U/L (ref 15–37)
BILIRUB SERPL-MCNC: 0.5 MG/DL (ref 0.2–1)
BUN SERPL-MCNC: 16 MG/DL (ref 6–20)
BUN/CREAT SERPL: 19 (ref 12–20)
CALCIUM SERPL-MCNC: 9.4 MG/DL (ref 8.5–10.1)
CHLORIDE SERPL-SCNC: 108 MMOL/L (ref 97–108)
CHOLEST SERPL-MCNC: 178 MG/DL
CO2 SERPL-SCNC: 30 MMOL/L (ref 21–32)
CREAT SERPL-MCNC: 0.85 MG/DL (ref 0.55–1.02)
GLOBULIN SER CALC-MCNC: 3.1 G/DL (ref 2–4)
GLUCOSE SERPL-MCNC: 84 MG/DL (ref 65–100)
HDLC SERPL-MCNC: 57 MG/DL
HDLC SERPL: 3.1 {RATIO} (ref 0–5)
LDLC SERPL CALC-MCNC: 101.2 MG/DL (ref 0–100)
POTASSIUM SERPL-SCNC: 4.2 MMOL/L (ref 3.5–5.1)
PROT SERPL-MCNC: 7.3 G/DL (ref 6.4–8.2)
SODIUM SERPL-SCNC: 141 MMOL/L (ref 136–145)
TRIGL SERPL-MCNC: 99 MG/DL (ref ?–150)
VLDLC SERPL CALC-MCNC: 19.8 MG/DL

## 2022-03-14 ENCOUNTER — HOSPITAL ENCOUNTER (OUTPATIENT)
Dept: MAMMOGRAPHY | Age: 62
Discharge: HOME | End: 2022-03-14
Attending: INTERNAL MEDICINE
Payer: COMMERCIAL

## 2022-03-14 DIAGNOSIS — Z12.31 ENCOUNTER FOR SCREENING MAMMOGRAM FOR MALIGNANT NEOPLASM OF BREAST: ICD-10-CM

## 2022-03-14 PROCEDURE — 77063 BREAST TOMOSYNTHESIS BI: CPT

## 2022-03-18 ENCOUNTER — TELEPHONE (OUTPATIENT)
Dept: INTERNAL MEDICINE CLINIC | Age: 62
End: 2022-03-18

## 2022-03-18 NOTE — TELEPHONE ENCOUNTER
----- Message from Seth Bermudez sent at 3/7/2022  2:45 PM EST -----  Subject: Message to Provider    QUESTIONS  Information for Provider? Patient states since her pap she has been having   pressure, discomfort and leaking when she tries to go to the bathroom. She   states sometimes it feels like she has to go and nothing comes out. She   would like to speak to the nurse about this did not want to schedule an   appointment.  ---------------------------------------------------------------------------  --------------  [address]  What is the best way for the office to contact you? OK to leave message on   voicemail  Preferred Call Back Phone Number? [phone]  ---------------------------------------------------------------------------  --------------  SCRIPT ANSWERS  Relationship to Patient?  Self

## 2022-03-18 NOTE — TELEPHONE ENCOUNTER
Attempted to call patient. No answer and no voicemail set up. Please call patient to see if ongoing symptoms, if so, please shedule for office visit to assess.      Liu Jones MD

## 2022-03-19 PROBLEM — I10 ESSENTIAL HYPERTENSION: Status: ACTIVE | Noted: 2018-10-02

## 2022-03-19 PROBLEM — E78.2 MIXED HYPERLIPIDEMIA: Status: ACTIVE | Noted: 2018-10-02

## 2022-03-20 PROBLEM — E66.01 SEVERE OBESITY (HCC): Status: ACTIVE | Noted: 2018-10-02

## 2022-07-29 ENCOUNTER — OFFICE VISIT (OUTPATIENT)
Dept: INTERNAL MEDICINE CLINIC | Age: 62
End: 2022-07-29
Payer: COMMERCIAL

## 2022-07-29 VITALS
SYSTOLIC BLOOD PRESSURE: 122 MMHG | HEART RATE: 77 BPM | WEIGHT: 220 LBS | BODY MASS INDEX: 35.36 KG/M2 | DIASTOLIC BLOOD PRESSURE: 76 MMHG | OXYGEN SATURATION: 96 % | HEIGHT: 66 IN | TEMPERATURE: 98.1 F

## 2022-07-29 DIAGNOSIS — R55 POSTURAL DIZZINESS WITH PRESYNCOPE: ICD-10-CM

## 2022-07-29 DIAGNOSIS — R73.09 ELEVATED GLUCOSE: ICD-10-CM

## 2022-07-29 DIAGNOSIS — R42 POSTURAL DIZZINESS WITH PRESYNCOPE: ICD-10-CM

## 2022-07-29 DIAGNOSIS — E78.2 MIXED HYPERLIPIDEMIA: ICD-10-CM

## 2022-07-29 DIAGNOSIS — I10 ESSENTIAL HYPERTENSION: Primary | ICD-10-CM

## 2022-07-29 LAB
ANION GAP SERPL CALC-SCNC: 5 MMOL/L (ref 5–15)
BUN SERPL-MCNC: 20 MG/DL (ref 6–20)
BUN/CREAT SERPL: 24 (ref 12–20)
CALCIUM SERPL-MCNC: 9.4 MG/DL (ref 8.5–10.1)
CHLORIDE SERPL-SCNC: 106 MMOL/L (ref 97–108)
CO2 SERPL-SCNC: 30 MMOL/L (ref 21–32)
CREAT SERPL-MCNC: 0.85 MG/DL (ref 0.55–1.02)
ERYTHROCYTE [DISTWIDTH] IN BLOOD BY AUTOMATED COUNT: 13.3 % (ref 11.5–14.5)
EST. AVERAGE GLUCOSE BLD GHB EST-MCNC: 105 MG/DL
GLUCOSE SERPL-MCNC: 78 MG/DL (ref 65–100)
HBA1C MFR BLD: 5.3 % (ref 4–5.6)
HCT VFR BLD AUTO: 41.5 % (ref 35–47)
HGB BLD-MCNC: 13 G/DL (ref 11.5–16)
MCH RBC QN AUTO: 29.5 PG (ref 26–34)
MCHC RBC AUTO-ENTMCNC: 31.3 G/DL (ref 30–36.5)
MCV RBC AUTO: 94.1 FL (ref 80–99)
NRBC # BLD: 0 K/UL (ref 0–0.01)
NRBC BLD-RTO: 0 PER 100 WBC
PLATELET # BLD AUTO: 253 K/UL (ref 150–400)
PMV BLD AUTO: 10.2 FL (ref 8.9–12.9)
POTASSIUM SERPL-SCNC: 4 MMOL/L (ref 3.5–5.1)
RBC # BLD AUTO: 4.41 M/UL (ref 3.8–5.2)
SODIUM SERPL-SCNC: 141 MMOL/L (ref 136–145)
TSH SERPL DL<=0.05 MIU/L-ACNC: 1.71 UIU/ML (ref 0.36–3.74)
WBC # BLD AUTO: 5.4 K/UL (ref 3.6–11)

## 2022-07-29 PROCEDURE — 99214 OFFICE O/P EST MOD 30 MIN: CPT | Performed by: INTERNAL MEDICINE

## 2022-07-29 PROCEDURE — 93000 ELECTROCARDIOGRAM COMPLETE: CPT | Performed by: INTERNAL MEDICINE

## 2022-07-29 NOTE — PROGRESS NOTES
A/P:  Mike Henderson is a 65 y.o. female, she presents today for:    1. Essential hypertension  -     TSH 3RD GENERATION; Future  -     CBC W/O DIFF; Future  -     NUCLEAR CARDIAC STRESS TEST; Future  2. Mixed hyperlipidemia  -     METABOLIC PANEL, BASIC; Future  -     NUCLEAR CARDIAC STRESS TEST; Future  3. Elevated glucose  -     HEMOGLOBIN A1C WITH EAG; Future  4. Postural dizziness with presyncope  -     TSH 3RD GENERATION; Future  -     CBC W/O DIFF; Future  -     AMB POC EKG ROUTINE W/ 12 LEADS, INTER & REP  -     NUCLEAR CARDIAC STRESS TEST; Future    Near syncopal episode - occurred yesterday in setting of working outdoors. As patient reports she was keeping up with fluid and food, and it came on suddenly, will pursue cardiac work-up to rule out primary coronary issue. - ekg today normal rate, rythmn, axis and waveforms. HTN: BP well controlled. continue 1/2 tablet prinzide. Obesity - losing weight. Discussed aiming for regular exercise     History of vitamin d deficiency - taking a gummy vitamin with vitamin D. Follow-up and Dispositions    Return in about 6 months (around 1/29/2023) for well visit. HPI    65 year woman with history of HTN, obesity, and hld. Presents for follow-up she is taking medicines as recommended. Yesterday had an episode of nausea and vomitting and dizziness after being in the heat and sun for 2 hours. EMT assessed and she felt better after cooling down. Deniees chest pain, racing heart or shortness of breath. This mornings feels well, no ongoing nausea nor headache. Making good urine. This is a one time episode. No exertional symptoms and relative active.      PMH/PSH: reviewed and updated  Sochx/Famhx: reviewed and updated     All: No Known Allergies  Med:   Current Outpatient Medications   Medication Sig    lisinopril-hydroCHLOROthiazide (PRINZIDE, ZESTORETIC) 20-12.5 mg per tablet TAKE ONE-HALF (1/2) TABLET DAILY    atorvastatin (LIPITOR) 20 mg tablet Take 1 Tablet by mouth daily. cholecalciferol (VITAMIN D3) (2,000 UNITS /50 MCG) cap capsule Take 1 Capsule by mouth daily. No current facility-administered medications for this visit. ROS pertinent for the following:  Review of Systems   Constitutional:  Negative for chills, fever and malaise/fatigue. Respiratory:  Negative for cough and shortness of breath. Cardiovascular:  Negative for chest pain. PE:  Blood pressure 122/76, pulse 77, temperature 98.1 °F (36.7 °C), height 5' 6\" (1.676 m), weight 220 lb (99.8 kg), SpO2 96 %. Body mass index is 35.51 kg/m². Physical Exam  Vitals and nursing note reviewed. Constitutional:       General: She is not in acute distress. Appearance: Normal appearance. HENT:      Head: Normocephalic and atraumatic. Left Ear: Tympanic membrane normal.      Nose: Nose normal. No congestion. Mouth/Throat:      Mouth: Mucous membranes are moist.   Eyes:      Extraocular Movements: Extraocular movements intact. Conjunctiva/sclera: Conjunctivae normal.      Pupils: Pupils are equal, round, and reactive to light. Cardiovascular:      Rate and Rhythm: Normal rate and regular rhythm. Heart sounds: Normal heart sounds. Pulmonary:      Effort: Pulmonary effort is normal.      Breath sounds: Normal breath sounds. Abdominal:      Palpations: Abdomen is soft. There is no mass. Musculoskeletal:         General: Normal range of motion. Cervical back: Normal range of motion and neck supple. Right lower leg: No edema. Left lower leg: No edema. Skin:     General: Skin is warm and dry. Neurological:      General: No focal deficit present. Mental Status: She is alert and oriented to person, place, and time. Psychiatric:         Mood and Affect: Mood normal.         Behavior: Behavior normal.     Labs:   See addendum for interpretation of labs resulting after time of visit.      She was given AVS and expressed understanding with the diagnosis and plan as discussed. An electronic signature was used to authenticate this note.   -- Matias Long MD

## 2022-07-29 NOTE — PATIENT INSTRUCTIONS
The following practices are recommended for establishing care with a new provider:     [address] Jeannine Sung NP   - Joey Bullard NP    [address] [facility]  [address] [facility]  [place], [address] [facility]  Get Directions  Tel: [phone]  Fax: [phone]  Internal Medicine   - Valerie Chung MD   - Asim Liu NP   - Melvin Stone NP    [facility], [address] [facility]  Get Directions  Tel: [phone]  Fax: [phone]

## 2022-07-29 NOTE — PROGRESS NOTES
RM 1  Pt is not fasting    Chief Complaint   Patient presents with    Blood Pressure Check     Had an episode at work last night where she got overheated and threw up. Concern for elevated BP       Visit Vitals  /76   Pulse 77   Temp 98.1 °F (36.7 °C)   Ht 5' 6\" (1.676 m)   Wt 220 lb (99.8 kg)   SpO2 96%   BMI 35.51 kg/m²       3 most recent PHQ Screens 7/29/2022   Little interest or pleasure in doing things Not at all   Feeling down, depressed, irritable, or hopeless Not at all   Total Score PHQ 2 0         1. Have you been to the ER, urgent care clinic since your last visit? Hospitalized since your last visit? No    2. Have you seen or consulted any other health care providers outside of the [address] [place] [address] since your last visit? Include any pap smears or colon screening. No    Health Maintenance Due   Topic Date Due    COVID-19 Vaccine (1) Never done       Learning Assessment 11/24/2015   PRIMARY LEARNER Patient   HIGHEST LEVEL OF EDUCATION - PRIMARY LEARNER  2 YEARS OF COLLEGE   BARRIERS PRIMARY LEARNER NONE   PRIMARY LANGUAGE ENGLISH   LEARNER PREFERENCE PRIMARY DEMONSTRATION   ANSWERED BY patient   RELATIONSHIP SELF         AVS  education, follow up, and recommendations provided and addressed with patient.   services used to advise patient -no

## 2022-07-30 NOTE — PROGRESS NOTES
Labs overall very good including  A1C: normal average blood sugar  CBC: normal blood counts. No anemia. BMP: kidney function.    TSH: normal thyroid function

## 2022-07-31 NOTE — PROGRESS NOTES
Labs overall very good including:  A1C: normal average blood sugar  CBC: normal blood counts. No anemia. BMP: normal kidney function.    TSH: normal thyroid function

## 2023-03-20 ENCOUNTER — TRANSCRIBE ORDER (OUTPATIENT)
Dept: SCHEDULING | Age: 63
End: 2023-03-20

## 2023-03-20 DIAGNOSIS — Z12.31 SCREENING MAMMOGRAM FOR HIGH-RISK PATIENT: Primary | ICD-10-CM

## 2023-04-22 ENCOUNTER — TRANSCRIBE ORDERS (OUTPATIENT)
Facility: HOSPITAL | Age: 63
End: 2023-04-22

## 2023-04-22 DIAGNOSIS — Z12.31 SCREENING MAMMOGRAM FOR HIGH-RISK PATIENT: Primary | ICD-10-CM

## 2023-04-23 DIAGNOSIS — Z12.31 SCREENING MAMMOGRAM FOR HIGH-RISK PATIENT: Primary | ICD-10-CM

## 2023-04-26 DIAGNOSIS — I10 ESSENTIAL HYPERTENSION: ICD-10-CM

## 2023-04-26 RX ORDER — LISINOPRIL AND HYDROCHLOROTHIAZIDE 12.5; 2 MG/1; MG/1
TABLET ORAL
Qty: 45 TABLET | Refills: 0 | Status: SHIPPED | OUTPATIENT
Start: 2023-04-26

## 2023-04-26 NOTE — TELEPHONE ENCOUNTER
Please contact patient for scheduling. Thanks    Last visit 07/29/2022 MD Lori Leong   Next appointment Advised to follow up in 6 months (01/2023) - Nothing scheduled   Previous refill encounter(s)   01/31/2022 Prinzide #45 with 4 refills.      For Pharmacy Admin Tracking Only    Program: Medication Refill  Intervention Detail: New Rx: 1, reason: Patient Preference  Time Spent (min): 5      Requested Prescriptions     Pending Prescriptions Disp Refills    lisinopril-hydroCHLOROthiazide (PRINZIDE, ZESTORETIC) 20-12.5 mg per tablet 45 Tablet 0     Sig: TAKE ONE-HALF (1/2) TABLET DAILY

## 2023-04-27 NOTE — TELEPHONE ENCOUNTER
Refill request(s) approved--lisinopril-HCTZ--90-day supply with 0 refill(s). Refill protocol details (computer-generated) reviewed, as available. LOV with PCP Dr. Alvaro Avila July 2022. Lab Results   Component Value Date/Time    Potassium 4.0 07/29/2022 09:45 AM     Lab Results   Component Value Date/Time    Creatinine 0.85 07/29/2022 09:45 AM       Personalhart message to pt--to schedule follow-up appt. Included in sig:  \"Need to schedule follow-up for further refills. \". Personalhart message to pt regarding need to establish with new PCP since Dr. Alvaro Avila has left practice. Per , letter with new PCP info mailed to all Dr. Barbie Herndon pts.

## 2023-05-18 ENCOUNTER — HOSPITAL ENCOUNTER (OUTPATIENT)
Facility: HOSPITAL | Age: 63
Discharge: HOME | End: 2023-05-18
Payer: COMMERCIAL

## 2023-05-18 DIAGNOSIS — Z12.31 SCREENING MAMMOGRAM FOR HIGH-RISK PATIENT: ICD-10-CM

## 2023-05-18 PROCEDURE — 77063 BREAST TOMOSYNTHESIS BI: CPT

## 2023-05-31 NOTE — TELEPHONE ENCOUNTER
Patient has an appt with new PCP on 6/22/23    Last appointment: 7/29/22 with MD Sydni Pardo  Previous refill encounter(s): 1/31/22 #90 with 4 refills    Requested Prescriptions     Pending Prescriptions Disp Refills    atorvastatin (LIPITOR) 20 MG tablet 90 tablet 0     Sig: Take 1 tablet by mouth daily         For Pharmacy Admin Tracking Only    Program: Medication Refill  CPA in place:    Recommendation Provided To:    Intervention Detail: New Rx: 1, reason: Patient Preference  Intervention Accepted By:   Tracie Goodpasture Closed?:    Time Spent (min): 5

## 2023-06-04 RX ORDER — ATORVASTATIN CALCIUM 20 MG/1
20 TABLET, FILM COATED ORAL DAILY
Qty: 30 TABLET | Refills: 0 | Status: SHIPPED | OUTPATIENT
Start: 2023-06-04

## 2023-09-21 ENCOUNTER — OFFICE VISIT (OUTPATIENT)
Age: 63
End: 2023-09-21
Payer: COMMERCIAL

## 2023-09-21 VITALS
RESPIRATION RATE: 17 BRPM | HEIGHT: 66 IN | TEMPERATURE: 98 F | WEIGHT: 233 LBS | BODY MASS INDEX: 37.45 KG/M2 | HEART RATE: 87 BPM | OXYGEN SATURATION: 98 % | SYSTOLIC BLOOD PRESSURE: 145 MMHG | DIASTOLIC BLOOD PRESSURE: 88 MMHG

## 2023-09-21 DIAGNOSIS — E55.9 VITAMIN D DEFICIENCY: ICD-10-CM

## 2023-09-21 DIAGNOSIS — E78.2 MIXED HYPERLIPIDEMIA: ICD-10-CM

## 2023-09-21 DIAGNOSIS — Z00.00 ENCOUNTER FOR MEDICAL EXAMINATION TO ESTABLISH CARE: ICD-10-CM

## 2023-09-21 DIAGNOSIS — E66.01 SEVERE OBESITY (HCC): ICD-10-CM

## 2023-09-21 DIAGNOSIS — I10 ESSENTIAL (PRIMARY) HYPERTENSION: ICD-10-CM

## 2023-09-21 DIAGNOSIS — Z12.11 COLON CANCER SCREENING: ICD-10-CM

## 2023-09-21 DIAGNOSIS — Z00.00 ENCOUNTER FOR MEDICAL EXAMINATION TO ESTABLISH CARE: Primary | ICD-10-CM

## 2023-09-21 PROCEDURE — 99203 OFFICE O/P NEW LOW 30 MIN: CPT | Performed by: INTERNAL MEDICINE

## 2023-09-21 PROCEDURE — 3079F DIAST BP 80-89 MM HG: CPT | Performed by: INTERNAL MEDICINE

## 2023-09-21 PROCEDURE — 3077F SYST BP >= 140 MM HG: CPT | Performed by: INTERNAL MEDICINE

## 2023-09-21 RX ORDER — LISINOPRIL AND HYDROCHLOROTHIAZIDE 20; 12.5 MG/1; MG/1
0.5 TABLET ORAL DAILY
Qty: 90 TABLET | Refills: 1 | Status: SHIPPED | OUTPATIENT
Start: 2023-09-21

## 2023-09-21 RX ORDER — ATORVASTATIN CALCIUM 20 MG/1
20 TABLET, FILM COATED ORAL DAILY
Qty: 90 TABLET | Refills: 1 | Status: SHIPPED | OUTPATIENT
Start: 2023-09-21

## 2023-09-21 SDOH — ECONOMIC STABILITY: INCOME INSECURITY: HOW HARD IS IT FOR YOU TO PAY FOR THE VERY BASICS LIKE FOOD, HOUSING, MEDICAL CARE, AND HEATING?: NOT HARD AT ALL

## 2023-09-21 SDOH — ECONOMIC STABILITY: FOOD INSECURITY: WITHIN THE PAST 12 MONTHS, THE FOOD YOU BOUGHT JUST DIDN'T LAST AND YOU DIDN'T HAVE MONEY TO GET MORE.: NEVER TRUE

## 2023-09-21 SDOH — ECONOMIC STABILITY: FOOD INSECURITY: WITHIN THE PAST 12 MONTHS, YOU WORRIED THAT YOUR FOOD WOULD RUN OUT BEFORE YOU GOT MONEY TO BUY MORE.: NEVER TRUE

## 2023-09-21 SDOH — ECONOMIC STABILITY: HOUSING INSECURITY
IN THE LAST 12 MONTHS, WAS THERE A TIME WHEN YOU DID NOT HAVE A STEADY PLACE TO SLEEP OR SLEPT IN A SHELTER (INCLUDING NOW)?: NO

## 2023-09-21 ASSESSMENT — PATIENT HEALTH QUESTIONNAIRE - PHQ9
SUM OF ALL RESPONSES TO PHQ QUESTIONS 1-9: 0
1. LITTLE INTEREST OR PLEASURE IN DOING THINGS: 0
2. FEELING DOWN, DEPRESSED OR HOPELESS: 0
SUM OF ALL RESPONSES TO PHQ9 QUESTIONS 1 & 2: 0

## 2023-09-21 ASSESSMENT — ANXIETY QUESTIONNAIRES
3. WORRYING TOO MUCH ABOUT DIFFERENT THINGS: 0
GAD7 TOTAL SCORE: 0
7. FEELING AFRAID AS IF SOMETHING AWFUL MIGHT HAPPEN: 0
5. BEING SO RESTLESS THAT IT IS HARD TO SIT STILL: 0
1. FEELING NERVOUS, ANXIOUS, OR ON EDGE: 0
4. TROUBLE RELAXING: 0
2. NOT BEING ABLE TO STOP OR CONTROL WORRYING: 0
6. BECOMING EASILY ANNOYED OR IRRITABLE: 0

## 2023-09-21 ASSESSMENT — ENCOUNTER SYMPTOMS
RESPIRATORY NEGATIVE: 1
GASTROINTESTINAL NEGATIVE: 1

## 2023-09-21 NOTE — PROGRESS NOTES
Teri Beal is a 59 y.o. female who presents today for the following: patient was seen to establish care. Has not seen a PCP in over a year. Has been off her BP medication for 3 weeks after she ran out. Is trying to get off medications. But also wants to work on weight loss. Mammogram Is completed. Will need a follow up colon screen. Also will need a follow up with blood work   Chief Complaint   Patient presents with    New Patient           PMH/PSH/Allergies/Social History/medication list and most recent studies reviewed with patient. reports that she has never smoked. She has never used smokeless tobacco.    reports no history of alcohol use. Vitals:    09/21/23 1618   BP: (!) 145/88   Pulse:    Resp:    Temp:    SpO2:       Past Medical History:   Diagnosis Date    Achilles tendinitis of right lower extremity 3/13/2017    Menopause     LMP-65years old? No Known Allergies     Current Outpatient Medications on File Prior to Visit   Medication Sig Dispense Refill    Cholecalciferol 50 MCG (2000 UT) CAPS Take 2,000 Units by mouth daily       No current facility-administered medications on file prior to visit. Review of Systems   Constitutional: Negative. Respiratory: Negative. Cardiovascular: Negative. Gastrointestinal: Negative. Musculoskeletal:  Negative for gait problem and neck pain. Neurological: Negative. Psychiatric/Behavioral: Negative. Objective    Physical Exam  Vitals and nursing note reviewed. Constitutional:       General: She is not in acute distress. Appearance: She is obese. Cardiovascular:      Rate and Rhythm: Normal rate and regular rhythm. Pulmonary:      Effort: Pulmonary effort is normal. No respiratory distress. Breath sounds: Normal breath sounds. Abdominal:      Palpations: Abdomen is soft. Musculoskeletal:      Right lower leg: No edema. Left lower leg: No edema.    Skin:     General: Skin

## 2023-09-21 NOTE — PROGRESS NOTES
1. Have you been to the ER, urgent care clinic since your last visit? Hospitalized since your last visit?   no    2. Have you seen or consulted any other health care providers outside of the [address] [place] [address] since your last visit? Include any pap smears or colon screening.     no

## 2023-09-28 LAB
25(OH)D3+25(OH)D2 SERPL-MCNC: 26.5 NG/ML (ref 30–100)
ALBUMIN SERPL-MCNC: 4.6 G/DL (ref 3.9–4.9)
ALBUMIN/GLOB SERPL: 1.8 {RATIO} (ref 1.2–2.2)
ALP SERPL-CCNC: 79 IU/L (ref 44–121)
ALT SERPL-CCNC: 21 IU/L (ref 0–32)
AST SERPL-CCNC: 21 IU/L (ref 0–40)
BASOPHILS # BLD AUTO: 0 X10E3/UL (ref 0–0.2)
BASOPHILS NFR BLD AUTO: 0 %
BILIRUB SERPL-MCNC: 0.3 MG/DL (ref 0–1.2)
BUN SERPL-MCNC: 16 MG/DL (ref 8–27)
BUN/CREAT SERPL: 20 (ref 12–28)
CALCIUM SERPL-MCNC: 9.5 MG/DL (ref 8.7–10.3)
CHLORIDE SERPL-SCNC: 101 MMOL/L (ref 96–106)
CHOLEST SERPL-MCNC: 220 MG/DL (ref 100–199)
CO2 SERPL-SCNC: 25 MMOL/L (ref 20–29)
CREAT SERPL-MCNC: 0.79 MG/DL (ref 0.57–1)
EGFRCR SERPLBLD CKD-EPI 2021: 85 ML/MIN/1.73
EOSINOPHIL # BLD AUTO: 0 X10E3/UL (ref 0–0.4)
EOSINOPHIL NFR BLD AUTO: 1 %
ERYTHROCYTE [DISTWIDTH] IN BLOOD BY AUTOMATED COUNT: 12.9 % (ref 11.7–15.4)
GLOBULIN SER CALC-MCNC: 2.6 G/DL (ref 1.5–4.5)
GLUCOSE SERPL-MCNC: 88 MG/DL (ref 70–99)
HCT VFR BLD AUTO: 42.3 % (ref 34–46.6)
HDLC SERPL-MCNC: 54 MG/DL
HGB BLD-MCNC: 13.5 G/DL (ref 11.1–15.9)
IMM GRANULOCYTES # BLD AUTO: 0 X10E3/UL (ref 0–0.1)
IMM GRANULOCYTES NFR BLD AUTO: 0 %
IMP & REVIEW OF LAB RESULTS: NORMAL
LDLC SERPL CALC-MCNC: 137 MG/DL (ref 0–99)
LYMPHOCYTES # BLD AUTO: 1.8 X10E3/UL (ref 0.7–3.1)
LYMPHOCYTES NFR BLD AUTO: 33 %
MCH RBC QN AUTO: 28.7 PG (ref 26.6–33)
MCHC RBC AUTO-ENTMCNC: 31.9 G/DL (ref 31.5–35.7)
MCV RBC AUTO: 90 FL (ref 79–97)
MONOCYTES # BLD AUTO: 0.5 X10E3/UL (ref 0.1–0.9)
MONOCYTES NFR BLD AUTO: 9 %
NEUTROPHILS # BLD AUTO: 3 X10E3/UL (ref 1.4–7)
NEUTROPHILS NFR BLD AUTO: 57 %
PLATELET # BLD AUTO: 261 X10E3/UL (ref 150–450)
POTASSIUM SERPL-SCNC: 4.3 MMOL/L (ref 3.5–5.2)
PROT SERPL-MCNC: 7.2 G/DL (ref 6–8.5)
RBC # BLD AUTO: 4.71 X10E6/UL (ref 3.77–5.28)
SODIUM SERPL-SCNC: 142 MMOL/L (ref 134–144)
TRIGL SERPL-MCNC: 164 MG/DL (ref 0–149)
TSH SERPL DL<=0.005 MIU/L-ACNC: 2.56 UIU/ML (ref 0.45–4.5)
VLDLC SERPL CALC-MCNC: 29 MG/DL (ref 5–40)
WBC # BLD AUTO: 5.3 X10E3/UL (ref 3.4–10.8)

## 2023-10-22 LAB — NONINV COLON CA DNA+OCC BLD SCRN STL QL: NEGATIVE

## 2024-08-23 ENCOUNTER — TRANSCRIBE ORDERS (OUTPATIENT)
Facility: HOSPITAL | Age: 64
End: 2024-08-23

## 2024-08-23 DIAGNOSIS — Z12.31 SCREENING MAMMOGRAM FOR HIGH-RISK PATIENT: Primary | ICD-10-CM

## 2024-08-26 SDOH — HEALTH STABILITY: PHYSICAL HEALTH: ON AVERAGE, HOW MANY DAYS PER WEEK DO YOU ENGAGE IN MODERATE TO STRENUOUS EXERCISE (LIKE A BRISK WALK)?: 2 DAYS

## 2024-08-28 ENCOUNTER — OFFICE VISIT (OUTPATIENT)
Age: 64
End: 2024-08-28
Payer: COMMERCIAL

## 2024-08-28 VITALS
HEART RATE: 71 BPM | DIASTOLIC BLOOD PRESSURE: 92 MMHG | SYSTOLIC BLOOD PRESSURE: 136 MMHG | WEIGHT: 231 LBS | TEMPERATURE: 97.5 F | OXYGEN SATURATION: 98 % | RESPIRATION RATE: 18 BRPM | BODY MASS INDEX: 37.12 KG/M2 | HEIGHT: 66 IN

## 2024-08-28 DIAGNOSIS — I10 ESSENTIAL (PRIMARY) HYPERTENSION: ICD-10-CM

## 2024-08-28 DIAGNOSIS — R07.89 ATYPICAL CHEST PAIN: ICD-10-CM

## 2024-08-28 DIAGNOSIS — E55.9 VITAMIN D DEFICIENCY: ICD-10-CM

## 2024-08-28 DIAGNOSIS — E78.2 MIXED HYPERLIPIDEMIA: Primary | ICD-10-CM

## 2024-08-28 LAB
ALBUMIN SERPL-MCNC: 4 G/DL (ref 3.5–5)
ALBUMIN/GLOB SERPL: 1.3 (ref 1.1–2.2)
ALP SERPL-CCNC: 75 U/L (ref 45–117)
ALT SERPL-CCNC: 22 U/L (ref 12–78)
ANION GAP SERPL CALC-SCNC: 3 MMOL/L (ref 5–15)
AST SERPL-CCNC: 17 U/L (ref 15–37)
BASOPHILS # BLD: 0 K/UL (ref 0–0.1)
BASOPHILS NFR BLD: 0 % (ref 0–1)
BILIRUB SERPL-MCNC: 0.6 MG/DL (ref 0.2–1)
BUN SERPL-MCNC: 17 MG/DL (ref 6–20)
BUN/CREAT SERPL: 19 (ref 12–20)
CALCIUM SERPL-MCNC: 9 MG/DL (ref 8.5–10.1)
CHLORIDE SERPL-SCNC: 108 MMOL/L (ref 97–108)
CHOLEST SERPL-MCNC: 197 MG/DL
CO2 SERPL-SCNC: 29 MMOL/L (ref 21–32)
CREAT SERPL-MCNC: 0.91 MG/DL (ref 0.55–1.02)
CREAT UR-MCNC: 144 MG/DL
DIFFERENTIAL METHOD BLD: NORMAL
EOSINOPHIL # BLD: 0 K/UL (ref 0–0.4)
EOSINOPHIL NFR BLD: 1 % (ref 0–7)
ERYTHROCYTE [DISTWIDTH] IN BLOOD BY AUTOMATED COUNT: 13.1 % (ref 11.5–14.5)
GLOBULIN SER CALC-MCNC: 3.2 G/DL (ref 2–4)
GLUCOSE SERPL-MCNC: 89 MG/DL (ref 65–100)
HCT VFR BLD AUTO: 42.7 % (ref 35–47)
HDLC SERPL-MCNC: 63 MG/DL
HDLC SERPL: 3.1 (ref 0–5)
HGB BLD-MCNC: 13.5 G/DL (ref 11.5–16)
IMM GRANULOCYTES # BLD AUTO: 0 K/UL (ref 0–0.04)
IMM GRANULOCYTES NFR BLD AUTO: 0 % (ref 0–0.5)
LDLC SERPL CALC-MCNC: 112.4 MG/DL (ref 0–100)
LYMPHOCYTES # BLD: 2 K/UL (ref 0.8–3.5)
LYMPHOCYTES NFR BLD: 35 % (ref 12–49)
MCH RBC QN AUTO: 28.9 PG (ref 26–34)
MCHC RBC AUTO-ENTMCNC: 31.6 G/DL (ref 30–36.5)
MCV RBC AUTO: 91.4 FL (ref 80–99)
MICROALBUMIN UR-MCNC: 3.08 MG/DL
MICROALBUMIN/CREAT UR-RTO: 21 MG/G (ref 0–30)
MONOCYTES # BLD: 0.5 K/UL (ref 0–1)
MONOCYTES NFR BLD: 9 % (ref 5–13)
NEUTS SEG # BLD: 3.2 K/UL (ref 1.8–8)
NEUTS SEG NFR BLD: 55 % (ref 32–75)
NRBC # BLD: 0 K/UL (ref 0–0.01)
NRBC BLD-RTO: 0 PER 100 WBC
PLATELET # BLD AUTO: 269 K/UL (ref 150–400)
PMV BLD AUTO: 10.3 FL (ref 8.9–12.9)
POTASSIUM SERPL-SCNC: 4 MMOL/L (ref 3.5–5.1)
PROT SERPL-MCNC: 7.2 G/DL (ref 6.4–8.2)
RBC # BLD AUTO: 4.67 M/UL (ref 3.8–5.2)
SODIUM SERPL-SCNC: 140 MMOL/L (ref 136–145)
TRIGL SERPL-MCNC: 108 MG/DL
TSH SERPL DL<=0.05 MIU/L-ACNC: 1.77 UIU/ML (ref 0.36–3.74)
VLDLC SERPL CALC-MCNC: 21.6 MG/DL
WBC # BLD AUTO: 5.7 K/UL (ref 3.6–11)

## 2024-08-28 PROCEDURE — 3075F SYST BP GE 130 - 139MM HG: CPT | Performed by: FAMILY MEDICINE

## 2024-08-28 PROCEDURE — 3079F DIAST BP 80-89 MM HG: CPT | Performed by: FAMILY MEDICINE

## 2024-08-28 PROCEDURE — 99215 OFFICE O/P EST HI 40 MIN: CPT | Performed by: FAMILY MEDICINE

## 2024-08-28 RX ORDER — ATORVASTATIN CALCIUM 20 MG/1
20 TABLET, FILM COATED ORAL NIGHTLY
Qty: 90 TABLET | Refills: 0 | Status: SHIPPED | OUTPATIENT
Start: 2024-08-28 | End: 2024-11-26

## 2024-08-28 RX ORDER — LISINOPRIL AND HYDROCHLOROTHIAZIDE 12.5; 2 MG/1; MG/1
0.5 TABLET ORAL DAILY
Qty: 90 TABLET | Refills: 1 | Status: CANCELLED | OUTPATIENT
Start: 2024-08-28

## 2024-08-28 RX ORDER — LOSARTAN POTASSIUM AND HYDROCHLOROTHIAZIDE 12.5; 5 MG/1; MG/1
1 TABLET ORAL DAILY
Qty: 30 TABLET | Refills: 3 | Status: SHIPPED | OUTPATIENT
Start: 2024-08-28

## 2024-08-28 SDOH — ECONOMIC STABILITY: FOOD INSECURITY: WITHIN THE PAST 12 MONTHS, THE FOOD YOU BOUGHT JUST DIDN'T LAST AND YOU DIDN'T HAVE MONEY TO GET MORE.: NEVER TRUE

## 2024-08-28 SDOH — ECONOMIC STABILITY: FOOD INSECURITY: WITHIN THE PAST 12 MONTHS, YOU WORRIED THAT YOUR FOOD WOULD RUN OUT BEFORE YOU GOT MONEY TO BUY MORE.: NEVER TRUE

## 2024-08-28 SDOH — ECONOMIC STABILITY: INCOME INSECURITY: HOW HARD IS IT FOR YOU TO PAY FOR THE VERY BASICS LIKE FOOD, HOUSING, MEDICAL CARE, AND HEATING?: NOT HARD AT ALL

## 2024-08-28 ASSESSMENT — PATIENT HEALTH QUESTIONNAIRE - PHQ9
SUM OF ALL RESPONSES TO PHQ QUESTIONS 1-9: 0
SUM OF ALL RESPONSES TO PHQ QUESTIONS 1-9: 0
1. LITTLE INTEREST OR PLEASURE IN DOING THINGS: NOT AT ALL
SUM OF ALL RESPONSES TO PHQ QUESTIONS 1-9: 0
2. FEELING DOWN, DEPRESSED OR HOPELESS: NOT AT ALL
SUM OF ALL RESPONSES TO PHQ QUESTIONS 1-9: 0
SUM OF ALL RESPONSES TO PHQ9 QUESTIONS 1 & 2: 0

## 2024-08-28 NOTE — PROGRESS NOTES
RM : 02    Chief Complaint   Patient presents with    Established New Doctor   Fasting    Vitals:    08/28/24 1401   BP: (!) 171/87   Pulse: 71   Resp: 18   Temp: 97.5 °F (36.4 °C)   SpO2: 98%         \"Have you been to the ER, urgent care clinic since your last visit?  Hospitalized since your last visit?\"    NO    “Have you seen or consulted any other health care providers outside of [facility] since your last visit?”    NO            Click Here for Release of Records Request      AVS  education, follow up, and recommendations provided and addressed with patient.

## 2024-08-28 NOTE — PATIENT INSTRUCTIONS
Your prinzide is being stopped for the cough. Take the new medication once daily in the morning.   Check your blood pressure regularly at home and send it via [employer].   Follow up in 6 wks.

## 2024-08-28 NOTE — PROGRESS NOTES
Marilu Dooley (:  1960) is a 64 y.o. female,Established patient, here for evaluation of the following chief complaint(s):  Established New Doctor    Assessment & Plan   ASSESSMENT/PLAN:  1. Mixed hyperlipidemia  Assessment & Plan:   Borderline controlled, continue current medications and medication adherence emphasized.  RF given.   Orders:  -     atorvastatin (LIPITOR) 20 MG tablet; Take 1 tablet by mouth at bedtime, Disp-90 tablet, R-0Normal  -     Microalbumin / Creatinine Urine Ratio; Future  -     CBC with Auto Differential; Future  -     Comprehensive Metabolic Panel; Future  -     Lipid Panel; Future  -     TSH; Future  2. Essential (primary) hypertension  Assessment & Plan:   Uncontrolled, changes made today: complicated by concern for ACE induced cough in addition to not taking her medication for 2 wks. Discontinue prinzide and transition to hyzaar.  Close clinical follow up with home BP monitoring. Pt with atypical chest pain with nl EKG.  Information given in AVS for chest pain and ER warnings.  Routine baseline labs ordered.  Orders:  -     losartan-hydroCHLOROthiazide (HYZAAR) 50-12.5 MG per tablet; Take 1 tablet by mouth daily, Disp-30 tablet, R-3Normal  -     MyChart Blood Pressure Flowsheet  3. Vitamin D deficiency  4. Atypical chest pain  -     EKG 12 Lead; Future      Return in about 6 weeks (around 10/9/2024) for HTN with results and medications.       On this date 2024 I have spent 40 minutes reviewing previous notes, test results and face to face with the patient discussing the diagnosis and importance of compliance with the treatment plan as well as documenting on the day of the visit.      Subjective   SUBJECTIVE/OBJECTIVE:  Pt presents as an est pt new to provider to est care.     1. HTN with mixed hyperlipidemia:   Complications: stroke/TIA: no, renal disease: no, CVD: no, eye: no  Current medications: prinzide, and lipitor  Medication compliance: taking daily however, she has    Neurological: Negative.  Negative for numbness.          Objective   Vitals:    08/28/24 1401 08/28/24 1451   BP: (!) 171/87 (!) 136/92   Site: Left Upper Arm Right Upper Arm   Position: Sitting Sitting   Cuff Size: Large Adult Large Adult   Pulse: 71    Resp: 18    Temp: 97.5 °F (36.4 °C)    TempSrc: Oral    SpO2: 98%    Weight: 104.8 kg (231 lb)    Height: 1.676 m (5' 6\")       Physical Exam  Vitals and nursing note reviewed.   Constitutional:       General: She is not in acute distress.     Appearance: Normal appearance. She is obese. She is not ill-appearing or toxic-appearing.   HENT:      Head: Normocephalic and atraumatic.      Mouth/Throat:      Mouth: Mucous membranes are moist.      Pharynx: Oropharynx is clear.   Neck:      Thyroid: No thyroid mass, thyromegaly or thyroid tenderness.      Vascular: No carotid bruit.   Cardiovascular:      Rate and Rhythm: Normal rate and regular rhythm.      Pulses: Normal pulses.      Heart sounds: Normal heart sounds, S1 normal and S2 normal. No murmur heard.     No friction rub. No gallop.      Comments: No abdominal bruit  Pulmonary:      Effort: Pulmonary effort is normal. No respiratory distress.      Breath sounds: Normal breath sounds. No wheezing, rhonchi or rales.   Abdominal:      General: Abdomen is flat. Bowel sounds are normal. There is no distension.      Palpations: Abdomen is soft.      Tenderness: There is no abdominal tenderness. There is no guarding or rebound.   Musculoskeletal:      Right lower leg: No edema.      Left lower leg: No edema.   Skin:     General: Skin is warm and dry.   Neurological:      General: No focal deficit present.      Mental Status: She is alert and oriented to person, place, and time. Mental status is at baseline.      Cranial Nerves: No cranial nerve deficit.      Sensory: No sensory deficit.      Motor: No weakness.      Deep Tendon Reflexes: Reflexes normal.     EKG: normal EKG, normal sinus rhythm, no acute findings.

## 2024-08-29 ENCOUNTER — HOSPITAL ENCOUNTER (OUTPATIENT)
Facility: HOSPITAL | Age: 64
Discharge: HOME | End: 2024-08-29
Attending: INTERNAL MEDICINE
Payer: COMMERCIAL

## 2024-08-29 VITALS — BODY MASS INDEX: 36.96 KG/M2 | HEIGHT: 66 IN | WEIGHT: 230 LBS

## 2024-08-29 DIAGNOSIS — Z12.31 SCREENING MAMMOGRAM FOR HIGH-RISK PATIENT: ICD-10-CM

## 2024-08-29 PROCEDURE — 77063 BREAST TOMOSYNTHESIS BI: CPT

## 2024-08-30 ASSESSMENT — ENCOUNTER SYMPTOMS
COUGH: 0
RESPIRATORY NEGATIVE: 1
GASTROINTESTINAL NEGATIVE: 1
EYES NEGATIVE: 1
COLOR CHANGE: 0
SHORTNESS OF BREATH: 0

## 2024-08-30 NOTE — ASSESSMENT & PLAN NOTE
Borderline controlled, continue current medications and medication adherence emphasized.  RF given.

## 2024-08-30 NOTE — ASSESSMENT & PLAN NOTE
Uncontrolled, changes made today: complicated by concern for ACE induced cough in addition to not taking her medication for 2 wks. Discontinue prinzide and transition to hyzaar.  Close clinical follow up with home BP monitoring. Pt with atypical chest pain with nl EKG.  Information given in AVS for chest pain and ER warnings.  Routine baseline labs ordered.

## 2024-08-30 NOTE — RESULT ENCOUNTER NOTE
Letter:  your mammogram is normal.  Have this repeated in 1 yr or sooner if needed. Keep your routinely scheduled appts.

## 2024-10-09 ENCOUNTER — OFFICE VISIT (OUTPATIENT)
Age: 64
End: 2024-10-09

## 2024-10-09 VITALS
TEMPERATURE: 97.8 F | HEIGHT: 66 IN | WEIGHT: 227.8 LBS | HEART RATE: 79 BPM | BODY MASS INDEX: 36.61 KG/M2 | SYSTOLIC BLOOD PRESSURE: 132 MMHG | OXYGEN SATURATION: 100 % | DIASTOLIC BLOOD PRESSURE: 82 MMHG | RESPIRATION RATE: 18 BRPM

## 2024-10-09 DIAGNOSIS — Z23 INFLUENZA VACCINATION ADMINISTERED AT CURRENT VISIT: ICD-10-CM

## 2024-10-09 DIAGNOSIS — I10 ESSENTIAL (PRIMARY) HYPERTENSION: Primary | ICD-10-CM

## 2024-10-09 DIAGNOSIS — E78.2 MIXED HYPERLIPIDEMIA: ICD-10-CM

## 2024-10-09 SDOH — ECONOMIC STABILITY: FOOD INSECURITY: WITHIN THE PAST 12 MONTHS, YOU WORRIED THAT YOUR FOOD WOULD RUN OUT BEFORE YOU GOT MONEY TO BUY MORE.: NEVER TRUE

## 2024-10-09 SDOH — ECONOMIC STABILITY: FOOD INSECURITY: WITHIN THE PAST 12 MONTHS, THE FOOD YOU BOUGHT JUST DIDN'T LAST AND YOU DIDN'T HAVE MONEY TO GET MORE.: NEVER TRUE

## 2024-10-09 SDOH — ECONOMIC STABILITY: INCOME INSECURITY: HOW HARD IS IT FOR YOU TO PAY FOR THE VERY BASICS LIKE FOOD, HOUSING, MEDICAL CARE, AND HEATING?: NOT HARD AT ALL

## 2024-10-09 ASSESSMENT — PATIENT HEALTH QUESTIONNAIRE - PHQ9
SUM OF ALL RESPONSES TO PHQ9 QUESTIONS 1 & 2: 0
SUM OF ALL RESPONSES TO PHQ QUESTIONS 1-9: 0
SUM OF ALL RESPONSES TO PHQ QUESTIONS 1-9: 0
1. LITTLE INTEREST OR PLEASURE IN DOING THINGS: NOT AT ALL
SUM OF ALL RESPONSES TO PHQ QUESTIONS 1-9: 0
2. FEELING DOWN, DEPRESSED OR HOPELESS: NOT AT ALL
SUM OF ALL RESPONSES TO PHQ QUESTIONS 1-9: 0

## 2024-10-09 ASSESSMENT — ENCOUNTER SYMPTOMS
GASTROINTESTINAL NEGATIVE: 1
COLOR CHANGE: 0
EYES NEGATIVE: 1
RESPIRATORY NEGATIVE: 1
COUGH: 0
SHORTNESS OF BREATH: 0

## 2024-10-09 NOTE — ASSESSMENT & PLAN NOTE
Chronic, at goal (stable), continue current treatment plan. Discussed with pt LDL goals. Continue diet and exercise. Currently no indication to adjust her medication. Repeat labs in Aug.

## 2024-10-09 NOTE — PROGRESS NOTES
After obtaining consent, and per orders of Dr. Baxter, injection of Flu given in Left deltoid by Josee Lam MA. Patient instructed to remain in clinic for 20 minutes afterwards, and to report any adverse reaction to me immediately.

## 2024-10-09 NOTE — PROGRESS NOTES
RM : 01    Chief Complaint   Patient presents with    Hypertension    Discuss Labs    Discuss Medications       Vitals:    10/09/24 1119   BP: 132/82   Pulse: 79   Resp: 18   Temp: 97.8 °F (36.6 °C)   SpO2: 100%         \"Have you been to the ER, urgent care clinic since your last visit?  Hospitalized since your last visit?\"    NO    “Have you seen or consulted any other health care providers outside of [facility] since your last visit?”    NO            Click Here for Release of Records Request      AVS  education, follow up, and recommendations provided and addressed with patient.

## 2024-10-09 NOTE — ASSESSMENT & PLAN NOTE
Chronic, at goal (stable), continue current treatment plan. Information given for mediterranean diet and encouraged pt to keep up with her exercise. Routine follow up in Aug since well controlled and tolerated transition to hyzaar from prinzide.

## 2024-10-09 NOTE — PROGRESS NOTES
Marilu Dooley (:  1960) is a 64 y.o. female,Established patient, here for evaluation of the following chief complaint(s):  Hypertension, Discuss Labs, and Discuss Medications    Assessment & Plan   ASSESSMENT/PLAN:  1. Essential (primary) hypertension  Assessment & Plan:   Chronic, at goal (stable), continue current treatment plan. Information given for mediterranean diet and encouraged pt to keep up with her exercise. Routine follow up in Aug since well controlled and tolerated transition to hyzaar from prinzide.   2. Mixed hyperlipidemia  Assessment & Plan:   Chronic, at goal (stable), continue current treatment plan. Discussed with pt LDL goals. Continue diet and exercise. Currently no indication to adjust her medication. Repeat labs in Aug.   3. Influenza vaccination administered at current visit  -     Influenza, FLUCELVAX Trivalent, (age 6 mo+) IM, Preservative Free, 0.5mL        Return in about 11 months (around 2025) for HTN, XOL with fasting labs.         Subjective   SUBJECTIVE/OBJECTIVE:  Pt presents as an est pt for routine follow up without significant interim hx.   HM: due for flu vaccination    1. HTN with mixed hyperlipidemia:   Complications: stroke/TIA: no, renal disease: no, CVD: no, eye: no  Current medications: hyzaar, and lipitor  Medication compliance: taking daily   ASA: no  Statin:  yes - has not taken the medication for 2 wks.   Hx of hypercholesterolemia/hyperlipidemia: unclear control  Medication side effects: none  Pt did have a concern for ACE induced cough which has resolved with transition to losartan.  Medication allergies or intolerances of previously tried medications: none  Home BP monitoring:  typically well controlled c/w in office readings.   Diet and exercise: exercising more with \"[employer] [place] [employer]\" which is a program lasting 10-15 min daily.  She is making slow consistent dietary changes.   ROS:  all cardiac ROS negative except for intermittent atypical chest pain

## 2024-10-09 NOTE — PATIENT INSTRUCTIONS
Keep up the great work! Continue your current medications.   Continue to work on your diet and exercise.

## 2024-11-15 DIAGNOSIS — E78.2 MIXED HYPERLIPIDEMIA: ICD-10-CM

## 2024-11-15 DIAGNOSIS — I10 ESSENTIAL (PRIMARY) HYPERTENSION: ICD-10-CM

## 2024-11-15 RX ORDER — ATORVASTATIN CALCIUM 20 MG/1
20 TABLET, FILM COATED ORAL NIGHTLY
Qty: 90 TABLET | Refills: 0 | Status: SHIPPED | OUTPATIENT
Start: 2024-11-15 | End: 2025-02-13

## 2024-11-15 RX ORDER — LOSARTAN POTASSIUM AND HYDROCHLOROTHIAZIDE 12.5; 5 MG/1; MG/1
1 TABLET ORAL DAILY
Qty: 90 TABLET | Refills: 1 | Status: SHIPPED | OUTPATIENT
Start: 2024-11-15 | End: 2025-05-14

## 2024-11-15 NOTE — TELEPHONE ENCOUNTER
Pt with borderline control of hyperlipidemia. Awaiting repeat labs in 3 mo. Orders placed for lab only visit.

## 2024-11-15 NOTE — TELEPHONE ENCOUNTER
[facility] is requesting new prescriptions. Previous prescriptions were sent to [facility].     Last appointment: 10/09/2024 MD Baxter   Next appointment: 08/29/2025 MD Baxter   Previous refill encounter(s):   08/28/2024:  - Hyzaar #30 with 3 refills,   - Lipitor #90.     For Pharmacy Admin Tracking Only    Program: Medication Refill  Intervention Detail: New Rx: 2, reason: Patient Preference  Time Spent (min): 5    Requested Prescriptions     Pending Prescriptions Disp Refills    atorvastatin (LIPITOR) 20 MG tablet [Pharmacy Med Name: ATORVASTATIN TABS 20MG] 90 tablet 0     Sig: Take 1 tablet by mouth at bedtime    losartan-hydroCHLOROthiazide (HYZAAR) 50-12.5 MG per tablet [Pharmacy Med Name: LOSARTAN/HCTZ TABS 50/12.5MG] 90 tablet 0     Sig: Take 1 tablet by mouth daily

## 2025-01-28 DIAGNOSIS — E78.2 MIXED HYPERLIPIDEMIA: ICD-10-CM

## 2025-01-29 NOTE — TELEPHONE ENCOUNTER
Future Appointments:  Future Appointments   Date Time Provider Department Center   8/29/2025  8:30 AM Catrachita Baxter MD [facility]        Last Appointment With Me:  10/9/2024     Requested Prescriptions     Pending Prescriptions Disp Refills    atorvastatin (LIPITOR) 20 MG tablet [Pharmacy Med Name: ATORVASTATIN TABS 20MG] 90 tablet 3     Sig: TAKE 1 TABLET AT BEDTIME (FASTING LABS DUE IN 3 MONTHS FOR FURTHER REFILLS)

## 2025-02-05 RX ORDER — ATORVASTATIN CALCIUM 20 MG/1
TABLET, FILM COATED ORAL
Qty: 90 TABLET | Refills: 1 | Status: SHIPPED | OUTPATIENT
Start: 2025-02-05

## 2025-05-13 DIAGNOSIS — I10 ESSENTIAL (PRIMARY) HYPERTENSION: ICD-10-CM

## 2025-05-13 NOTE — TELEPHONE ENCOUNTER
Last appointment: 10/09/2024 MD Baxter   Next appointment: 08/29/2025 MD Baxter   Previous refill encounter(s):   11/15/2024 Hyzaar #90 with 1 refill.     For Pharmacy Admin Tracking Only    Program: Medication Refill  Intervention Detail: New Rx: 1, reason: Patient Preference  Time Spent (min): 5    Requested Prescriptions     Pending Prescriptions Disp Refills    losartan-hydroCHLOROthiazide (HYZAAR) 50-12.5 MG per tablet [Pharmacy Med Name: LOSARTAN/HCTZ TABS 50/12.5MG] 90 tablet 1     Sig: TAKE 1 TABLET DAILY

## 2025-05-14 RX ORDER — LOSARTAN POTASSIUM AND HYDROCHLOROTHIAZIDE 12.5; 5 MG/1; MG/1
1 TABLET ORAL DAILY
Qty: 90 TABLET | Refills: 1 | Status: SHIPPED | OUTPATIENT
Start: 2025-05-14